# Patient Record
Sex: FEMALE | Race: BLACK OR AFRICAN AMERICAN | NOT HISPANIC OR LATINO | Employment: UNEMPLOYED | URBAN - METROPOLITAN AREA
[De-identification: names, ages, dates, MRNs, and addresses within clinical notes are randomized per-mention and may not be internally consistent; named-entity substitution may affect disease eponyms.]

---

## 2023-12-01 PROBLEM — F41.8 DEPRESSION WITH ANXIETY: Status: ACTIVE | Noted: 2023-12-01

## 2023-12-01 PROBLEM — L08.9 WOUND INFECTION: Status: ACTIVE | Noted: 2023-12-01

## 2023-12-01 PROBLEM — R30.0 DYSURIA: Status: ACTIVE | Noted: 2023-12-01

## 2023-12-01 PROBLEM — M25.50 ARTHRALGIA: Status: ACTIVE | Noted: 2023-12-01

## 2023-12-01 PROBLEM — C73 PAPILLARY CARCINOMA OF THYROID (MULTI): Status: ACTIVE | Noted: 2023-12-01

## 2023-12-01 PROBLEM — E55.9 VITAMIN D DEFICIENCY: Status: ACTIVE | Noted: 2023-12-01

## 2023-12-01 PROBLEM — C16.9: Status: ACTIVE | Noted: 2023-12-01

## 2023-12-01 PROBLEM — M76.61 ACHILLES TENDINITIS OF RIGHT LOWER EXTREMITY: Status: ACTIVE | Noted: 2023-12-01

## 2023-12-01 PROBLEM — G47.00 INSOMNIA: Status: ACTIVE | Noted: 2023-12-01

## 2023-12-01 PROBLEM — D64.9 ANEMIA: Status: ACTIVE | Noted: 2023-12-01

## 2023-12-01 PROBLEM — E61.1 IRON DEFICIENCY: Status: ACTIVE | Noted: 2023-12-01

## 2023-12-01 PROBLEM — F31.9 BIPOLAR DISORDER (MULTI): Status: ACTIVE | Noted: 2023-12-01

## 2023-12-01 PROBLEM — B37.9 YEAST INFECTION: Status: ACTIVE | Noted: 2023-12-01

## 2023-12-01 PROBLEM — H18.832 RECURRENT EROSION OF LEFT CORNEA: Status: ACTIVE | Noted: 2023-12-01

## 2023-12-01 PROBLEM — R82.90 ABNORMAL URINE FINDINGS: Status: ACTIVE | Noted: 2023-12-01

## 2023-12-01 PROBLEM — Z98.890 HISTORY OF ESOPHAGOGASTRODUODENOSCOPY (EGD): Status: ACTIVE | Noted: 2023-12-01

## 2023-12-01 PROBLEM — H18.529 ABM (ANTERIOR BASEMENT MEMBRANE) DYSTROPHY: Status: ACTIVE | Noted: 2023-12-01

## 2023-12-01 PROBLEM — N39.0 URINARY TRACT INFECTION: Status: ACTIVE | Noted: 2023-12-01

## 2023-12-01 PROBLEM — M76.62 ACHILLES TENDINITIS OF LEFT LOWER EXTREMITY: Status: ACTIVE | Noted: 2023-12-01

## 2023-12-01 PROBLEM — N92.0 MENORRHAGIA: Status: ACTIVE | Noted: 2023-12-01

## 2023-12-01 PROBLEM — E04.9 GOITER: Status: ACTIVE | Noted: 2023-12-01

## 2023-12-01 PROBLEM — E66.9 OBESITY: Status: ACTIVE | Noted: 2023-12-01

## 2023-12-01 PROBLEM — T14.8XXA WOUND INFECTION: Status: ACTIVE | Noted: 2023-12-01

## 2023-12-01 PROBLEM — E11.9 DIABETES MELLITUS TYPE 2 WITHOUT RETINOPATHY (MULTI): Status: ACTIVE | Noted: 2023-12-01

## 2023-12-01 PROBLEM — E03.9 HYPOTHYROIDISM: Status: ACTIVE | Noted: 2023-12-01

## 2023-12-01 PROBLEM — F41.9 ANXIETY: Status: ACTIVE | Noted: 2023-12-01

## 2023-12-01 PROBLEM — C50.919 CANCER OF BREAST (MULTI): Status: ACTIVE | Noted: 2023-12-01

## 2023-12-01 PROBLEM — N64.4 BREAST PAIN: Status: ACTIVE | Noted: 2023-12-01

## 2023-12-01 RX ORDER — ALOGLIPTIN 25 MG/1
1 TABLET, FILM COATED ORAL DAILY
COMMUNITY
Start: 2017-03-08

## 2023-12-01 RX ORDER — LEVOTHYROXINE SODIUM 50 UG/1
TABLET ORAL
COMMUNITY
Start: 2020-02-03 | End: 2024-03-14 | Stop reason: SDUPTHER

## 2023-12-01 RX ORDER — CHOLECALCIFEROL (VITAMIN D3) 50 MCG
1 TABLET ORAL DAILY
COMMUNITY
Start: 2017-04-19 | End: 2024-03-19 | Stop reason: SDUPTHER

## 2023-12-01 RX ORDER — BLOOD-GLUCOSE METER
KIT MISCELLANEOUS 2 TIMES DAILY
COMMUNITY
Start: 2014-08-07

## 2023-12-01 RX ORDER — SITAGLIPTIN AND METFORMIN HYDROCHLORIDE 1000; 50 MG/1; MG/1
1 TABLET, FILM COATED ORAL
COMMUNITY
Start: 2022-06-27 | End: 2024-05-09 | Stop reason: WASHOUT

## 2023-12-01 RX ORDER — TALC
POWDER (GRAM) TOPICAL
COMMUNITY
Start: 2014-04-08

## 2023-12-01 RX ORDER — FERROUS SULFATE 325(65) MG
1 TABLET ORAL
COMMUNITY
Start: 2017-04-19 | End: 2024-05-14 | Stop reason: SDUPTHER

## 2024-03-06 ENCOUNTER — LAB (OUTPATIENT)
Dept: LAB | Facility: LAB | Age: 44
End: 2024-03-06
Payer: COMMERCIAL

## 2024-03-06 ENCOUNTER — OFFICE VISIT (OUTPATIENT)
Dept: ENDOCRINOLOGY | Facility: HOSPITAL | Age: 44
End: 2024-03-06
Payer: COMMERCIAL

## 2024-03-06 VITALS
HEART RATE: 101 BPM | WEIGHT: 193 LBS | SYSTOLIC BLOOD PRESSURE: 127 MMHG | BODY MASS INDEX: 31.02 KG/M2 | HEIGHT: 66 IN | TEMPERATURE: 97.5 F | OXYGEN SATURATION: 100 % | DIASTOLIC BLOOD PRESSURE: 81 MMHG

## 2024-03-06 DIAGNOSIS — E03.8 HYPOTHYROIDISM DUE TO HASHIMOTO'S THYROIDITIS: ICD-10-CM

## 2024-03-06 DIAGNOSIS — E06.3 HYPOTHYROIDISM DUE TO HASHIMOTO'S THYROIDITIS: ICD-10-CM

## 2024-03-06 DIAGNOSIS — E11.9 DIABETES MELLITUS TYPE 2 WITHOUT RETINOPATHY (MULTI): ICD-10-CM

## 2024-03-06 DIAGNOSIS — C73 PAPILLARY THYROID CARCINOMA (MULTI): ICD-10-CM

## 2024-03-06 DIAGNOSIS — E11.9 DIABETES MELLITUS TYPE 2 WITHOUT RETINOPATHY (MULTI): Primary | ICD-10-CM

## 2024-03-06 LAB
25(OH)D3 SERPL-MCNC: 26 NG/ML (ref 30–100)
ALBUMIN SERPL BCP-MCNC: 3.8 G/DL (ref 3.4–5)
ANION GAP SERPL CALC-SCNC: 13 MMOL/L (ref 10–20)
BUN SERPL-MCNC: 9 MG/DL (ref 6–23)
CALCIUM SERPL-MCNC: 8.8 MG/DL (ref 8.6–10.6)
CHLORIDE SERPL-SCNC: 103 MMOL/L (ref 98–107)
CHOLEST SERPL-MCNC: 188 MG/DL (ref 0–199)
CHOLESTEROL/HDL RATIO: 3.2
CO2 SERPL-SCNC: 24 MMOL/L (ref 21–32)
CREAT SERPL-MCNC: 0.78 MG/DL (ref 0.5–1.05)
CREAT UR-MCNC: 80.4 MG/DL (ref 20–320)
EGFRCR SERPLBLD CKD-EPI 2021: >90 ML/MIN/1.73M*2
EST. AVERAGE GLUCOSE BLD GHB EST-MCNC: 243 MG/DL
GLUCOSE BLD MANUAL STRIP-MCNC: 343 MG/DL (ref 74–99)
GLUCOSE SERPL-MCNC: 294 MG/DL (ref 74–99)
HBA1C MFR BLD: 10.1 %
HDLC SERPL-MCNC: 59.5 MG/DL
LDLC SERPL CALC-MCNC: 85 MG/DL
MICROALBUMIN UR-MCNC: 15.4 MG/L
MICROALBUMIN/CREAT UR: 19.2 UG/MG CREAT
NON HDL CHOLESTEROL: 129 MG/DL (ref 0–149)
PHOSPHATE SERPL-MCNC: 4 MG/DL (ref 2.5–4.9)
POTASSIUM SERPL-SCNC: 4.3 MMOL/L (ref 3.5–5.3)
PTH-INTACT SERPL-MCNC: 28.4 PG/ML (ref 18.5–88)
SODIUM SERPL-SCNC: 136 MMOL/L (ref 136–145)
T4 FREE SERPL-MCNC: 0.53 NG/DL (ref 0.78–1.48)
TRIGL SERPL-MCNC: 216 MG/DL (ref 0–149)
TSH SERPL-ACNC: 35.63 MIU/L (ref 0.44–3.98)
VLDL: 43 MG/DL (ref 0–40)

## 2024-03-06 PROCEDURE — 36415 COLL VENOUS BLD VENIPUNCTURE: CPT

## 2024-03-06 PROCEDURE — 80069 RENAL FUNCTION PANEL: CPT

## 2024-03-06 PROCEDURE — 84439 ASSAY OF FREE THYROXINE: CPT

## 2024-03-06 PROCEDURE — 3048F LDL-C <100 MG/DL: CPT | Performed by: STUDENT IN AN ORGANIZED HEALTH CARE EDUCATION/TRAINING PROGRAM

## 2024-03-06 PROCEDURE — 99215 OFFICE O/P EST HI 40 MIN: CPT | Performed by: STUDENT IN AN ORGANIZED HEALTH CARE EDUCATION/TRAINING PROGRAM

## 2024-03-06 PROCEDURE — 83970 ASSAY OF PARATHORMONE: CPT

## 2024-03-06 PROCEDURE — 3061F NEG MICROALBUMINURIA REV: CPT | Performed by: STUDENT IN AN ORGANIZED HEALTH CARE EDUCATION/TRAINING PROGRAM

## 2024-03-06 PROCEDURE — 36416 COLLJ CAPILLARY BLOOD SPEC: CPT | Performed by: STUDENT IN AN ORGANIZED HEALTH CARE EDUCATION/TRAINING PROGRAM

## 2024-03-06 PROCEDURE — 82043 UR ALBUMIN QUANTITATIVE: CPT

## 2024-03-06 PROCEDURE — 83036 HEMOGLOBIN GLYCOSYLATED A1C: CPT

## 2024-03-06 PROCEDURE — 1036F TOBACCO NON-USER: CPT | Performed by: STUDENT IN AN ORGANIZED HEALTH CARE EDUCATION/TRAINING PROGRAM

## 2024-03-06 PROCEDURE — 84432 ASSAY OF THYROGLOBULIN: CPT

## 2024-03-06 PROCEDURE — 3074F SYST BP LT 130 MM HG: CPT | Performed by: STUDENT IN AN ORGANIZED HEALTH CARE EDUCATION/TRAINING PROGRAM

## 2024-03-06 PROCEDURE — 3079F DIAST BP 80-89 MM HG: CPT | Performed by: STUDENT IN AN ORGANIZED HEALTH CARE EDUCATION/TRAINING PROGRAM

## 2024-03-06 PROCEDURE — 82306 VITAMIN D 25 HYDROXY: CPT

## 2024-03-06 PROCEDURE — 3046F HEMOGLOBIN A1C LEVEL >9.0%: CPT | Performed by: STUDENT IN AN ORGANIZED HEALTH CARE EDUCATION/TRAINING PROGRAM

## 2024-03-06 PROCEDURE — 82947 ASSAY GLUCOSE BLOOD QUANT: CPT | Performed by: STUDENT IN AN ORGANIZED HEALTH CARE EDUCATION/TRAINING PROGRAM

## 2024-03-06 PROCEDURE — 84443 ASSAY THYROID STIM HORMONE: CPT

## 2024-03-06 PROCEDURE — 86800 THYROGLOBULIN ANTIBODY: CPT

## 2024-03-06 PROCEDURE — 82570 ASSAY OF URINE CREATININE: CPT

## 2024-03-06 PROCEDURE — 80061 LIPID PANEL: CPT

## 2024-03-06 RX ORDER — BLOOD-GLUCOSE SENSOR
EACH MISCELLANEOUS
Qty: 2 EACH | Refills: 11 | Status: SHIPPED | OUTPATIENT
Start: 2024-03-06

## 2024-03-06 RX ORDER — METFORMIN HYDROCHLORIDE 1000 MG/1
1000 TABLET ORAL
Qty: 180 TABLET | Refills: 3 | Status: SHIPPED | OUTPATIENT
Start: 2024-03-06 | End: 2025-03-06

## 2024-03-06 RX ORDER — DULAGLUTIDE 0.75 MG/.5ML
0.75 INJECTION, SOLUTION SUBCUTANEOUS
Qty: 2 ML | Refills: 3 | Status: SHIPPED | OUTPATIENT
Start: 2024-03-06

## 2024-03-06 RX ORDER — GLIPIZIDE 5 MG/1
5 TABLET ORAL
Qty: 180 TABLET | Refills: 3 | Status: SHIPPED | OUTPATIENT
Start: 2024-03-06 | End: 2025-03-06

## 2024-03-06 SDOH — ECONOMIC STABILITY: FOOD INSECURITY: WITHIN THE PAST 12 MONTHS, THE FOOD YOU BOUGHT JUST DIDN'T LAST AND YOU DIDN'T HAVE MONEY TO GET MORE.: NEVER TRUE

## 2024-03-06 SDOH — ECONOMIC STABILITY: FOOD INSECURITY: WITHIN THE PAST 12 MONTHS, YOU WORRIED THAT YOUR FOOD WOULD RUN OUT BEFORE YOU GOT MONEY TO BUY MORE.: NEVER TRUE

## 2024-03-06 ASSESSMENT — ENCOUNTER SYMPTOMS
DEPRESSION: 0
LOSS OF SENSATION IN FEET: 0
OCCASIONAL FEELINGS OF UNSTEADINESS: 0

## 2024-03-06 ASSESSMENT — LIFESTYLE VARIABLES
SKIP TO QUESTIONS 9-10: 1
HOW OFTEN DO YOU HAVE A DRINK CONTAINING ALCOHOL: NEVER
AUDIT-C TOTAL SCORE: 0
HOW MANY STANDARD DRINKS CONTAINING ALCOHOL DO YOU HAVE ON A TYPICAL DAY: PATIENT DOES NOT DRINK
HOW OFTEN DO YOU HAVE SIX OR MORE DRINKS ON ONE OCCASION: NEVER

## 2024-03-06 ASSESSMENT — PATIENT HEALTH QUESTIONNAIRE - PHQ9
6. FEELING BAD ABOUT YOURSELF - OR THAT YOU ARE A FAILURE OR HAVE LET YOURSELF OR YOUR FAMILY DOWN: NEARLY EVERY DAY
1. LITTLE INTEREST OR PLEASURE IN DOING THINGS: NEARLY EVERY DAY
4. FEELING TIRED OR HAVING LITTLE ENERGY: NEARLY EVERY DAY
3. TROUBLE FALLING OR STAYING ASLEEP: NEARLY EVERY DAY
SUM OF ALL RESPONSES TO PHQ QUESTIONS 1-9: 22
9. THOUGHTS THAT YOU WOULD BE BETTER OFF DEAD, OR OF HURTING YOURSELF: SEVERAL DAYS
5. POOR APPETITE OR OVEREATING: NEARLY EVERY DAY
10. IF YOU CHECKED OFF ANY PROBLEMS, HOW DIFFICULT HAVE THESE PROBLEMS MADE IT FOR YOU TO DO YOUR WORK, TAKE CARE OF THINGS AT HOME, OR GET ALONG WITH OTHER PEOPLE: NOT DIFFICULT AT ALL
2. FEELING DOWN, DEPRESSED OR HOPELESS: NEARLY EVERY DAY
SUM OF ALL RESPONSES TO PHQ9 QUESTIONS 1 & 2: 6
7. TROUBLE CONCENTRATING ON THINGS, SUCH AS READING THE NEWSPAPER OR WATCHING TELEVISION: NEARLY EVERY DAY
8. MOVING OR SPEAKING SO SLOWLY THAT OTHER PEOPLE COULD HAVE NOTICED. OR THE OPPOSITE, BEING SO FIGETY OR RESTLESS THAT YOU HAVE BEEN MOVING AROUND A LOT MORE THAN USUAL: NOT AT ALL

## 2024-03-06 ASSESSMENT — PAIN SCALES - GENERAL: PAINLEVEL: 0-NO PAIN

## 2024-03-06 NOTE — PATIENT INSTRUCTIONS
Stop Janumet  Start Metformin 1000 mg twice daily with meals  Start Trulicity 0.75 mg once weekly  Start glipizide 5 mg twice daily  Check BG 2-3 times a day (Always in the morning before breakfast, and alternate before lunch, before dinner or bed time)  Watch diet and dietician referral  Podiatry and ophthalmology referral   Blood work today    For your thyroid  Continue Levothyroxine 250 mcg daily  to be taken on an empty stomach on its own 30min before other meds or food   Blood work today  Ultrasound thyroid    Clinical pharmacy in 2 months    RTC in July

## 2024-03-06 NOTE — PROGRESS NOTES
HPI: Stephanie Flores is a 42yo F with PMH of T2DM, anemia, breast cancer, vitamin D deficiency, DLD, PTC s/p total thyroidectomy, and obesity who presents to establish care for T2DM and PTC s/p total thyroidectomy.     Pertinent T2DM history:  -Diagnosed with T2DM: 2011. Had gotten surgery for breast cancer and 3 months afterwards, started to have vomiting and weight loss. Went to the ER and BG was 500s. Was admitted to the ICU and started on insulin gtt. Discharged on insulin. Was on insulin for 1 year and then transitioned to oral medications.   -Last HbA1C 10% (4/6/22)    Current diabetes regimen:  -Janumet 50-1000mg BID    Previous medications:  -Metformin alone  -Alogliptin (can't remember why it was stopped)  -Insulin (MDI--4x/day injections) for about 1 year after diagnosis    Diabetes screening  -Retinopathy: Unknown, no recent screening eye exam. Has some blurry vision  -Nephropathy: No ESRD. Last urine albumin/Cr 53.6 (6/27/22), last Cr 0.71, (4/6/22). Not on ACEi/ARB or SGLT2i  -Neuropathy: Has some numbness/tingling in legs  -CV disease: No prior CAD. Last LDL 99  (6/7/22). Not on statin    Pertinent thyroid hx:   -Diagnosed in 2015 s/p total thyroidectomy 5/11/15. Path showing 2.2 cm focus of follicular variant PTC, no invasion, and additional 1 mm focus. 1 parathyroid gland removed as well, mild post-op hypocalcemia  -Reportedly no remnant ablation done due to social situation but no evidence of persistent disease   -Thyrogen stimulated Tg in 10/2015 was 1.87 with a TSH of 23, unable to do body scans.   Tg down to 0.6 with TSH 1.6 in 3/2016  -Thyrogen testing done 12/2016. TSH highest 21, Tg 2.8. No scanning done.  -Previously followed with Dr. Louie Knowles in endocrinology, last seen 7/26/21; sporadic follow-up  -Currently on levothyroxine 250mcg daily, has been on this for about 3-4 years  -Last TSH 3.88 (4/6/22)  -Last TG 1.0 (7/6/21) with TSH 2.67, FT4 1.91    Today:  -Has been struggling with dizziness  "and carpal tunnel in the last year  -Recent illnesses or steroid exposure:  Denies   -Accuchecks 1x/day maybe. Checks in the afternoon (about 1 hour after lunch) or if she is feeling off. Bgs are running in the 170-200s mainly.    Currrent medications:  -Janumet 50-1000mg BID: Takes consistently, no missed doses  -Levothyroxine: On 250mcg daily. Takes it once per day, waits for 1 hour to eat breakfast and takes it separately from her other medications  -Not on any medications for cholesterol or high blood pressure     OTC medications: Denies    Hypoglycemia: Denies     Diet:  -Breakfast: Sausage, grits, eggs  -Lunch: [place]   -Dinner: Baked or air fried chicken, rice, and veggie   -Snack: Occasionally, will sometimes snack on chips and dip  -Beverages: A lot of pop (mostly regular) , water    Exercise: Walks and does sit-ups    ROS:  -Hx of UTI: None in almost 2 years, used to have them often. Stopped out of the blue.   -Hx of pancreatitis:  Denies   -Neck pain: Denies anterior neck pain  -Numbness/tingling: In her b/l feet  -Feet issues: Denies  -HA: If BG is high  -Vision changes: Endorses mild blurry vision  -Hoarseness: Denies  -Dysphagia: Yes, has some issues with meats if they aren't cut up small or super tender. Feels like they get stuck.  -Acid reflux: Denies   -Night sweats/hot flashes: frequently  -Periods: Monthly, heavy. Lasting 8 days now instead of 10 days   -Sleep: Takes awhile to fall asleep, but sleeps well  -Energy:  Low   -Appetite: \"Fickle,\" comes and goes   -Change in weight: Lost about 20 lbs intentionally (was stuck at 220 lbs for years, now down 193 lbs)   -Diarrhea/Constipation: Alternates between diarrhea and constipation  -Polyuria: Denies   -Polydipsia: Does feel thirsty a lot   -Tremors: Denies  -Muscle cramps: In her legs   -Nausea/vomiting: Denies   -Abdominal Pain: Denies     10 point ROS neg unless stated above    PMH: as above    Allergies: NKDA     Social Hx:  -Alcohol: " "Denies  -Tobacco: Denies  -Illicits: Denies  , has children    Family Hx:  -Mother: pancreatic and ovarian cancers  -Grandmother: breast cancer and lung cancer  -Great grandmother: stomach cancer  -Great aunt: breast cancer  -Cousin: stomach cancer  -Sister: lung cancer   -Father: HTN, MI   -Denies family hx of thyroid cancer    Current Outpatient Medications   Medication Instructions    alogliptin (Nesina) 25 mg tablet 1 tablet, oral, Daily    cholecalciferol (Vitamin D-3) 50 MCG (2000 UT) tablet 1 tablet, oral, Daily    ferrous sulfate, 325 mg ferrous sulfate, tablet 1 tablet, oral, 2 times daily with meals    FreeStyle Lite Strips strip 2 times daily    levothyroxine (Synthroid, Levoxyl) 50 mcg tablet oral    melatonin 3 mg tablet oral    sitaGLIPtin phos-metformin (Janumet) 50-1,000 mg tablet 1 tablet, oral, 2 times daily with meals      O:  VS: /81 (BP Location: Right arm, Patient Position: Sitting, BP Cuff Size: Adult)   Pulse 101   Temp 36.4 °C (97.5 °F) (Temporal)   Ht 1.664 m (5' 5.5\")   Wt 87.5 kg (193 lb)   SpO2 100%   BMI 31.63 kg/m²      PE:   General: Alert, oriented x 3. No acute distress.  HEENT: EOMI, PERRL. Oral cavity mucosa moist. No palpable thyroid tissue or LAD. Anterior neck scar noted.   Lungs: Clear to auscultation.  Heart: S1 and S2, regular.  Abd: soft, NT, BS +ve, obese  Ext: no LE edema.  Skin: warm, dry.  Neuro: AOx3, no focal deficits, DTR 2+  Psych: Appropriate mood and behavior     LABS:          A/P:  Stephanie Flores is a 42yo F with PMH of T2DM, anemia, breast cancer, vitamin D deficiency, DLD, PTC s/p total thyroidectomy, and obesity who presents to establish care for T2DM and PTC s/p total thyroidectomy.     #T2DM c/b nephropathy and ?neuropathy  ::Goal HbA1C 7.0%, last HbA1C 10% (4/6/22)  -STOP janumet 50-1000mg BID   -START metformin 1000mg BID   -START trulicity 0.75mg weekly  -START glipizide 5mg BID with meals   -Accuchecks 2-3x/day    -Order Freestyle " sloane 3  -Due for updated labs: HbA1C, CMP, Lipid panel, urine microalb/Cr  -Consider initiation of statin and ACEi/ARB next appt   -Refer to dietician   -Refer to podiatry given numbness/tingling in her feet  -Refer to ophthalmology for screening eye exam, advised yearly eye exams    #PTC s/p total thyroidectomy (2015) with neg stimulated TG in 2015 and 2016  ::Clinically euthyroid  -Obtain updated TSH, FT4, and thyroglobulin levels  -Obtain thyroid ultrasound for baseline   -c/w levothyroxine 250mcg daily, to be taken first thing in the AM on an empty stomach  from other medications/food by at least 30 minutes     RTC in 4 mo with endocrinology   RTC in 2 mo with clinical pharmD     Patient seen, discussed, and examined with Dr. Young who agrees with the management plan

## 2024-03-08 LAB
BILL ONLY-THYROGLOBULIN: NORMAL
THYROGLOB AB SERPL-ACNC: <0.9 IU/ML (ref 0–4)
THYROGLOB SERPL-MCNC: 12.3 NG/ML (ref 1.3–31.8)
THYROGLOB SERPL-MCNC: NORMAL NG/ML (ref 1.3–31.8)

## 2024-03-14 DIAGNOSIS — E03.8 OTHER SPECIFIED HYPOTHYROIDISM: ICD-10-CM

## 2024-03-14 DIAGNOSIS — E55.9 VITAMIN D DEFICIENCY: ICD-10-CM

## 2024-03-14 RX ORDER — LEVOTHYROXINE SODIUM 200 UG/1
1 TABLET ORAL DAILY
COMMUNITY
Start: 2015-05-12 | End: 2024-03-14 | Stop reason: SDUPTHER

## 2024-03-15 RX ORDER — LEVOTHYROXINE SODIUM 200 UG/1
TABLET ORAL
Qty: 30 TABLET | Refills: 3 | Status: SHIPPED | OUTPATIENT
Start: 2024-03-15

## 2024-03-15 RX ORDER — LEVOTHYROXINE SODIUM 50 UG/1
TABLET ORAL
Qty: 30 TABLET | Refills: 3 | Status: SHIPPED | OUTPATIENT
Start: 2024-03-15

## 2024-03-19 RX ORDER — CHOLECALCIFEROL (VITAMIN D3) 50 MCG
TABLET ORAL
Qty: 30 TABLET | Refills: 3 | Status: SHIPPED | OUTPATIENT
Start: 2024-03-19 | End: 2024-03-20 | Stop reason: SDUPTHER

## 2024-03-20 ENCOUNTER — TELEPHONE (OUTPATIENT)
Dept: PRIMARY CARE | Facility: HOSPITAL | Age: 44
End: 2024-03-20
Payer: COMMERCIAL

## 2024-03-20 DIAGNOSIS — E03.8 OTHER SPECIFIED HYPOTHYROIDISM: ICD-10-CM

## 2024-03-20 RX ORDER — CHOLECALCIFEROL (VITAMIN D3) 50 MCG
TABLET ORAL
Qty: 30 TABLET | Refills: 3 | Status: SHIPPED | OUTPATIENT
Start: 2024-03-20

## 2024-03-20 NOTE — TELEPHONE ENCOUNTER
Called patient and discussed blood work.  Was not taking her Levothyroxine regularly (missed a lot of levothyroxine) restarted now.  Will repeat in 6 weeks.   TG elevated has her US scheduled on the 1st. Will decide if anything else is needed.   Vitamin D 26 start vitamin D 2000IU daily  A1c elevated continue Diabetes meds as discussed

## 2024-03-29 ENCOUNTER — TELEMEDICINE CLINICAL SUPPORT (OUTPATIENT)
Dept: NUTRITION | Facility: HOSPITAL | Age: 44
End: 2024-03-29
Payer: COMMERCIAL

## 2024-03-29 VITALS — WEIGHT: 193 LBS | BODY MASS INDEX: 31.02 KG/M2 | HEIGHT: 66 IN

## 2024-03-29 DIAGNOSIS — E11.9 DIABETES MELLITUS TYPE 2 WITHOUT RETINOPATHY (MULTI): ICD-10-CM

## 2024-03-29 PROCEDURE — 97802 MEDICAL NUTRITION INDIV IN: CPT | Mod: GT | Performed by: STUDENT IN AN ORGANIZED HEALTH CARE EDUCATION/TRAINING PROGRAM

## 2024-03-29 NOTE — PATIENT INSTRUCTIONS
1) Eat 3 consistent meals per day with 1-2 snacks in between  2) Consume 45g of carbs per meal, 15g carb choice for a snack   3) Make sure protein is included in every meal and snack   4) Increase water intake to 64 ounces   5) Limit added sugars to less than 25g per day   6) Use Plate method to build meals: 1/2 nonstarchy vegetables, 1/4 starch, 1/4 protein   7) Keep track of blood sugars   8) Increase physical activity

## 2024-03-29 NOTE — LETTER
"March 29, 2024     Betsy Young MD  [id number]  Department Of Medicine-Endocrinology  [facility] [address]    Patient: Stephanie Flores   YOB: 1980   Date of Visit: 3/29/2024       Dear Dr. Betsy Young MD:    Thank you for referring Stephanie Flores to me for evaluation. Below are my notes for this consultation.  If you have questions, please do not hesitate to call me. I look forward to following your patient along with you.       Sincerely,     Missy Duarte, ANGELIC, LD      CC: No Recipients  ______________________________________________________________________________________    Nutrition Assessment    Reason for Visit:  Stephanie Flores is a 43 y.o. female who presents for diabetes education.     Anthropometrics:  Anthropometrics  Height: 166.4 cm (5' 5.5\")  Weight: 87.5 kg (193 lb)  BMI (Calculated): 31.62  IBW/kg (Dietitian Calculated): 56.8 kg  Percent of IBW: 154 %         Food And Nutrient Intake:  Food and Nutrient History  Food and Nutrient History: Pt is on new DM meds, Trulicity and on her last injection today. Pt has had no appetite since starting Trulicity. Prior to medication pt was eating more carbs. Pt has done carb counting in the past and aware of this method. Currently eating about 1 carb per meal, eats about 2 meals per day. Every now again pt has snacks. Checks BG 3x daily. In the AM: 90mg/dL, afternoon/lunch: 125mg/dL, after dinner 117-125mg/dL.  Energy Intake: Fair 50-75 %  Fluid Intake: water, tea powder with stevia.  Food Allergy: none  Food Intolerance: none  GI Symptoms: none     Food Intake  Meal 1: Breakfast: grits with eggs, and sausage  Meal 2: Lunch: no lunch  Meal 3: Dinner: chicken wings  Snacks: none    Food Preparation  Cooking: Patient  Grocery Shopping: Patient  Dining Out: Rare to None       Bioactive Substance Intake  Pattern of Alcohol Consumption: None    Physical Activities:  Physical Activity  Physical Activity History: Walk 1 mile and a " "half  Consistency: Yes  Frequency (times/week): 3 (times/week)  Duration (minutes/day): 60 minutes/day      Nutrition Focused Physical Exam:  Subcutaneous Fat Loss  Orbital Fat Pads: Defer (VIR)        Energy Needs  Calculated Energy Needs Using Equations  Height: 166.4 cm (5' 5.5\")  Estimated Energy Needs  Total Energy Estimated Needs (kCal): 1600 kCal  Method for Estimating Needs: MSJ  Estimated Fluid Needs  Method for Estimating Needs: 64 ounces or 1ml/kcal  Estimated Protein Needs  Method for Estimating Needs: 70-88g (0.8-1g/kg ABW)  Estimated Carbohydrate Needs  Method for Estimating Needs: 2-3 carb choices per meal, 1 carb choice per snack        Nutrition Diagnosis     Nutrition Diagnosis  Patient has Nutrition Diagnosis: Yes  Diagnosis Status (1): New  Nutrition Diagnosis 1: Inadequate carbohydrate intake  Related to (1): DM2  As Evidenced by (1): carbohydrate intake inconsistent with needs, A1C 10.1%  Additional Assessment Information (1): low appetite due to new medication.    Nutrition Interventions/Recommendations  Nutrition Prescription  Individualized Nutrition Prescription Provided for : 1600kcal, 45g CCD  Food and Nutrition Delivery  Meals & Snacks: Carbohydrate-modified diet, Energy-modified diet  Goals: 3 meals per day with 1-2 snack in between  Nutrition Education  Nutrition Education Content: Content related nutrition education (plate method, reviewed carb counting)  Goals: Pt to comply with both verbal and written education provided in the one-one-one setting.  Nutrition Counseling  Strategies: Nutrition counseling based on goal setting strategy  Goals: 1) Eat 3 consistent meals per day with 1-2 snacks in between 2) Consume 45g of carbs per meal, 15g carb choice for a snack 3) Make sure protein is included in every meal and snack 4) Increase water intake to 64 ounces 5) Limit added sugars to less than 25g per day 6) Use Plate method to build meals: 1/2 nonstarchy vegetables, 1/4 starch, 1/4 " protein 7) Keep track of blood sugars 8) Increase physical activity        Patient Instructions   1) Eat 3 consistent meals per day with 1-2 snacks in between  2) Consume 45g of carbs per meal, 15g carb choice for a snack   3) Make sure protein is included in every meal and snack   4) Increase water intake to 64 ounces   5) Limit added sugars to less than 25g per day   6) Use Plate method to build meals: 1/2 nonstarchy vegetables, 1/4 starch, 1/4 protein   7) Keep track of blood sugars   8) Increase physical activity     Nutrition Monitoring and Evaluation  Food/Nutrient Related History Monitoring  Monitoring and Evaluation Plan: Energy intake, Fluid intake, Meal/snack pattern, Carbohydrate intake  Energy Intake: Estimated energy intake  Criteria: Intake vs Recommendations  Fluid Intake: Estimated fluid intake  Criteria: Intake vs Recommendations  Meal/Snack Pattern: Estimated meal and snack pattern  Criteria: Intake vs Recommendations  Estimated carbohydrate intake: Estimated carbohydrate intake  Criteria: Intake vs Recommendations  Body Composition/Growth/Weight History  Monitoring and Evaluation Plan: Weight  Weight: Measured weight  Criteria: Recent wt vs future weights  Biochemical Data, Medical Tests and Procedures  Monitoring and Evaluation Plan: Glucose/endocrine profile  Glucose/Endocrine Profile: Glucose, casual, Glucose, fasting, Hemoglobin A1c (HgbA1c)  Criteria: Blood sugars to stay WNL, fasting 70-130mg/dL, 1-2 hours after eating <180mg/dL, A1C <7%          Time Spent  Time spent directly with patient, family or caregiver: 20 minutes        Readiness to Change : Good  Level of Understanding : Good  Anticipated Compliant : Good

## 2024-03-29 NOTE — PROGRESS NOTES
"Nutrition Assessment     Reason for Visit:  Stephanie Flores is a 43 y.o. female who presents for diabetes education.     Anthropometrics:  Anthropometrics  Height: 166.4 cm (5' 5.5\")  Weight: 87.5 kg (193 lb)  BMI (Calculated): 31.62  IBW/kg (Dietitian Calculated): 56.8 kg  Percent of IBW: 154 %         Food And Nutrient Intake:  Food and Nutrient History  Food and Nutrient History: Pt is on new DM meds, Trulicity and on her last injection today. Pt has had no appetite since starting Trulicity. Prior to medication pt was eating more carbs. Pt has done carb counting in the past and aware of this method. Currently eating about 1 carb per meal, eats about 2 meals per day. Every now again pt has snacks. Checks BG 3x daily. In the AM: 90mg/dL, afternoon/lunch: 125mg/dL, after dinner 117-125mg/dL.  Energy Intake: Fair 50-75 %  Fluid Intake: water, tea powder with stevia.  Food Allergy: none  Food Intolerance: none  GI Symptoms: none     Food Intake  Meal 1: Breakfast: grits with eggs, and sausage  Meal 2: Lunch: no lunch  Meal 3: Dinner: chicken wings  Snacks: none    Food Preparation  Cooking: Patient  Grocery Shopping: Patient  Dining Out: Rare to None       Bioactive Substance Intake  Pattern of Alcohol Consumption: None    Physical Activities:  Physical Activity  Physical Activity History: Walk 1 mile and a half  Consistency: Yes  Frequency (times/week): 3 (times/week)  Duration (minutes/day): 60 minutes/day      Nutrition Focused Physical Exam:  Subcutaneous Fat Loss  Orbital Fat Pads: Defer (VIR)        Energy Needs  Calculated Energy Needs Using Equations  Height: 166.4 cm (5' 5.5\")  Estimated Energy Needs  Total Energy Estimated Needs (kCal): 1600 kCal  Method for Estimating Needs: MSJ  Estimated Fluid Needs  Method for Estimating Needs: 64 ounces or 1ml/kcal  Estimated Protein Needs  Method for Estimating Needs: 70-88g (0.8-1g/kg ABW)  Estimated Carbohydrate Needs  Method for Estimating Needs: 2-3 carb choices per " meal, 1 carb choice per snack        Nutrition Diagnosis      Nutrition Diagnosis  Patient has Nutrition Diagnosis: Yes  Diagnosis Status (1): New  Nutrition Diagnosis 1: Inadequate carbohydrate intake  Related to (1): DM2  As Evidenced by (1): carbohydrate intake inconsistent with needs, A1C 10.1%  Additional Assessment Information (1): low appetite due to new medication.    Nutrition Interventions/Recommendations   Nutrition Prescription  Individualized Nutrition Prescription Provided for : 1600kcal, 45g CCD  Food and Nutrition Delivery  Meals & Snacks: Carbohydrate-modified diet, Energy-modified diet  Goals: 3 meals per day with 1-2 snack in between  Nutrition Education  Nutrition Education Content: Content related nutrition education (plate method, reviewed carb counting)  Goals: Pt to comply with both verbal and written education provided in the one-one-one setting.  Nutrition Counseling  Strategies: Nutrition counseling based on goal setting strategy  Goals: 1) Eat 3 consistent meals per day with 1-2 snacks in between 2) Consume 45g of carbs per meal, 15g carb choice for a snack 3) Make sure protein is included in every meal and snack 4) Increase water intake to 64 ounces 5) Limit added sugars to less than 25g per day 6) Use Plate method to build meals: 1/2 nonstarchy vegetables, 1/4 starch, 1/4 protein 7) Keep track of blood sugars 8) Increase physical activity        Patient Instructions   1) Eat 3 consistent meals per day with 1-2 snacks in between  2) Consume 45g of carbs per meal, 15g carb choice for a snack   3) Make sure protein is included in every meal and snack   4) Increase water intake to 64 ounces   5) Limit added sugars to less than 25g per day   6) Use Plate method to build meals: 1/2 nonstarchy vegetables, 1/4 starch, 1/4 protein   7) Keep track of blood sugars   8) Increase physical activity     Nutrition Monitoring and Evaluation   Food/Nutrient Related History Monitoring  Monitoring and  Evaluation Plan: Energy intake, Fluid intake, Meal/snack pattern, Carbohydrate intake  Energy Intake: Estimated energy intake  Criteria: Intake vs Recommendations  Fluid Intake: Estimated fluid intake  Criteria: Intake vs Recommendations  Meal/Snack Pattern: Estimated meal and snack pattern  Criteria: Intake vs Recommendations  Estimated carbohydrate intake: Estimated carbohydrate intake  Criteria: Intake vs Recommendations  Body Composition/Growth/Weight History  Monitoring and Evaluation Plan: Weight  Weight: Measured weight  Criteria: Recent wt vs future weights  Biochemical Data, Medical Tests and Procedures  Monitoring and Evaluation Plan: Glucose/endocrine profile  Glucose/Endocrine Profile: Glucose, casual, Glucose, fasting, Hemoglobin A1c (HgbA1c)  Criteria: Blood sugars to stay WNL, fasting 70-130mg/dL, 1-2 hours after eating <180mg/dL, A1C <7%          Time Spent  Time spent directly with patient, family or caregiver: 20 minutes        Readiness to Change : Good  Level of Understanding : Good  Anticipated Compliant : Good

## 2024-04-01 ENCOUNTER — HOSPITAL ENCOUNTER (OUTPATIENT)
Dept: RADIOLOGY | Facility: HOSPITAL | Age: 44
Discharge: HOME | End: 2024-04-01
Payer: COMMERCIAL

## 2024-04-01 DIAGNOSIS — C73 PAPILLARY THYROID CARCINOMA (MULTI): ICD-10-CM

## 2024-04-01 DIAGNOSIS — E11.9 DIABETES MELLITUS TYPE 2 WITHOUT RETINOPATHY (MULTI): ICD-10-CM

## 2024-04-01 PROCEDURE — 76536 US EXAM OF HEAD AND NECK: CPT | Performed by: RADIOLOGY

## 2024-04-01 PROCEDURE — 76536 US EXAM OF HEAD AND NECK: CPT

## 2024-04-17 ENCOUNTER — OFFICE VISIT (OUTPATIENT)
Dept: OTOLARYNGOLOGY | Facility: CLINIC | Age: 44
End: 2024-04-17
Payer: COMMERCIAL

## 2024-04-17 VITALS — HEIGHT: 65 IN | WEIGHT: 188.6 LBS | BODY MASS INDEX: 31.42 KG/M2 | TEMPERATURE: 97.5 F

## 2024-04-17 DIAGNOSIS — H81.11 BENIGN PAROXYSMAL POSITIONAL VERTIGO OF RIGHT EAR: Primary | ICD-10-CM

## 2024-04-17 DIAGNOSIS — H93.11 TINNITUS OF RIGHT EAR: ICD-10-CM

## 2024-04-17 PROCEDURE — 3061F NEG MICROALBUMINURIA REV: CPT | Performed by: NURSE PRACTITIONER

## 2024-04-17 PROCEDURE — 3046F HEMOGLOBIN A1C LEVEL >9.0%: CPT | Performed by: NURSE PRACTITIONER

## 2024-04-17 PROCEDURE — 3048F LDL-C <100 MG/DL: CPT | Performed by: NURSE PRACTITIONER

## 2024-04-17 PROCEDURE — 1036F TOBACCO NON-USER: CPT | Performed by: NURSE PRACTITIONER

## 2024-04-17 PROCEDURE — 99204 OFFICE O/P NEW MOD 45 MIN: CPT | Performed by: NURSE PRACTITIONER

## 2024-04-17 ASSESSMENT — PATIENT HEALTH QUESTIONNAIRE - PHQ9
2. FEELING DOWN, DEPRESSED OR HOPELESS: NOT AT ALL
1. LITTLE INTEREST OR PLEASURE IN DOING THINGS: NOT AT ALL
SUM OF ALL RESPONSES TO PHQ9 QUESTIONS 1 AND 2: 0

## 2024-04-17 NOTE — PATIENT INSTRUCTIONS
You may have a condition called BPPV which stands for benign paroxysmal positional vertigo.  See information below  Please obtain the audiogram, VNG and vHIT.  I will discuss results with you once these are completed.                            Yeah

## 2024-04-17 NOTE — PROGRESS NOTES
Subjective   Patient ID: Stephanie Flores is a 43 y.o. female who presents for Tinnitus (bilteral) and Vertigo.  No hearing test on file.  HPI  Patient has a history of type 2 diabetes, goiter, thyroid cancer status post chemo and radiation, obesity and vertigo.    Patient recalls vertigo started about 2 years ago.  She was laying down and turned that the vertigo started.  She states that she knows it is coming from her right ear as this is also the ear that is affected with tinnitus but no significant hearing loss.  She describes vertigo as brief and induced with turning head quickly, laying down in bed,  getting up from laying down and bending over.  This occurs mostly daily.  She was evaluated in the ED before and was prescribed meclizine and Valium which does not help.  Her tinnitus is constantly present.  She describes it as high-pitched noise predominantly right ear.        She denies any other history of ear surgery or trauma to the ears.  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  No family history of ear disease and no exposure to ototoxic medications.  No complaints of drainage or ear pain.    When asked about ear pain, headache, phono-photophobia, visual or motion intolerance, sound or pressure induced symptoms, hearing loss, discharge from ear,, aural fullness or autophony, the patient admits to none.            Review of Systems       All other systems have been reviewed and are negative for complaints except for those mentioned in history of present illness, past medical history and problem list       Objective   Physical Exam    CONSTITUTIONAL: No acute distress, normal facial features; No fever; no chills  VOICE: No hoarseness or other audible abnormality  RESPIRATION: Breathing comfortably, no stridor; normal breathing effort  CV: No cyanosis visible on the face and neck area  EYES:Pupils equal and round ; no erythema; conjunctiva clear; sclera white; no nystagmus with head  shake.  NEURO: Alert and oriented, able to raise eyebrows symmetrical bilateral, smile with no facial droop, able to swallow  HEAD AND FACE: Symmetric facial features, no masses or lesions    Right ear examination: External ear normal.  EAC is clear.  TM intact with no effusion, retraction or perforation.  Left ear examination: External ear normal.  EAC is clear.  TM intact with no effusion, retraction or perforation.    Flowers midline  Rinne positive bilateral    Fukuda normal  Romberg normal  Tandem gait normal    NOSE: External nose midline; nasal turbinates normal no mucopus or polyps.  ORAL CAVITY: No lesions of external lips; uvula is midline; tongue with good mobility; no gross mass in oral cavity; mucosa appears pink   NECK/LYMPH: No obvious deformity or lesions; trachea is midline  PSYCH: Alert and oriented with appropriate mood and affect.      Assessment/Plan       Problem List Items Addressed This Visit    None  Visit Diagnoses       Benign paroxysmal positional vertigo of right ear    -  Primary    Relevant Orders    Referral to Audiology    Electronystagmography    V-Hit    Tinnitus of right ear            Based on patient HPI, she seemed to have BPPV.  This patient does not have audiogram on the visit today therefore I ordered a hearing test along with VNG and vHIT.  I will discuss results when these are completed plan for vestibular rehab for the BPPV.  She was also provided information in the discussion summary regarding management of BPPV at home.    This note was created using speech recognition transcription software. Despite proofreading, several typographical errors might be present that might affect the meaning of the content. Please call with any questions.         MELANY Tadeo-QUYNH 04/17/24 1:08 PM

## 2024-04-22 NOTE — PROGRESS NOTES
"ADULT BALANCE FUNCTION TEST (BFT)      Name:  Stephanie Flores  :  1980  Age:  43 y.o.  Date of Evaluation:  2024    IMPRESSIONS     Suspected bilateral peripheral vestibular involvement given the following: Overall low total SPV for both left and right caloric irrigations. Evidence of high frequency function given normal vHIT tracings. The vestibular system appears to be compensated physiologically and uncompensated functionally. While today's evaluation is indicative of bilateral involvement, interpret results with caution as rotational chair testing is considered the \"gold standard\" for diagnosis of bilateral involvement.     Additionally, positive evidence for active Benign Paroxysmal Positional Vertigo (BPPV) given the following: Presence of torsional up-beating nystagmus present in the head right position. Nystagmus decayed over time. Patient reported symptoms of dizziness. Reversal seen upon returning to sitting position. Indicative of right posterior canalithiasis. Additional down-beating nystagmus without torsional component in the head left position and following initial up-beating in head right position. Nystagmus did not decay over time. Indicative of possible multiple canal involvement (anterior cupulolithiasis). Recommend attempting Epley maneuver for relief of symptoms and possible Yacovino to address possible anterior canal component. Canalith repositioning performed today (i.e. Epley maneuver) before leaving today's appointment. Patient tolerated treatment well.    There were no indications of central vestibular system pathway involvement. Normal observation of gait and transfers. However given today's test results, patient is at risk of future falls.    RECOMMENDATIONS     Consider vestibular physical therapy to address uncompensated bilateral vestibulopathy. Emphasis on sensory substitution exercises to account for inadequate vestibular input, gaze stabilization exercises for VOR " "deficiencies, habituation exercises to triggers, and fall risk prevention. Additional management of BPPV recommended.   Consider re-evaluation as medically indicated.  Maintain a healthy lifestyle to help body function overall.  Continue monitoring per ENT/PCP preference.    Time: 8739-7927    HISTORY     Patient was seen for Balance Function Testing (BFT) due to a history of dizziness/imbalance. Vestibular case history collected via patient-clinician interview, patient chart review, and patient questionnaires.    Patient reported history of dizziness described as vertigo/spinning.  Symptoms began suddenly two years ago while laying down and turning over in bed.  Episodes last several seconds before subsiding.  Symptoms are provoked by turning head quickly, laying down in bed, sitting upright in bed, rolling over in bed, fast movements, looking up, and bending over. However notes symptoms can occur without triggering movements.  Overall the patient's symptoms have increased in severity over time. Of note, patient was previously given Meclizine without perceived benefit.  Relevant medical history includes unilateral right-sided tinnitus, thyroid cancer, breast cancer, and diabetes.  Most recent audiologic evaluation performed on 04/23/2024 by Clyde Cabrera CCC-A revealed normal hearing sensitivity, bilaterally. Tympanometry revealed normal eardrum mobility and canal volume, bilaterally.  Most recent vertigo evaluation performed on 04/17/2024 by MELANY Tadeo-CNP revealed normal functional testing including Fukuda, Romberg, and Tandem Gait.   Patient reported complying with pre-test instructions.      EVALUATION     See VNG & vHIT Raw Data in \"Media\"    TEST RESULTS     BEDSIDE ASSESSMENT TEST  The bedside assessment is an optional portion of the test battery to further assist in differential diagnosis and screen for eye abnormalities which may affect testing.    Deferred on this date due to late " arrival to appointment (25 minutes late).      VIDEONYSTAGMOGRAPHY (VNG) TEST  VNG provides objective indications of peripheral and central vestibulo-ocular pathway involvement. Ocular motor testing to visually guided targets is conducted using a dual channel video-recording technique for the recording of eye movement in the horizontal and vertical planes. Air caloric testing is performed at 48 degrees C and 24 degrees C.    Spontaneous Nystagmus test was absent. Spontaneous nystagmus testing may help with the identification of an acute or uncompensated peripheral vestibular lesion.   Gaze Nystagmus test was normal. Gaze nystagmus testing is to evaluate for nystagmus that is evoked by holding eye gaze in any particular direction. True gaze nystagmus is amplified when vision is denied.   Random Saccades test was normal. Random saccade testing is to evaluate patient's ability to make fast random eye movements along a horizontal moving target.   Smooth Pursuit/Tracking test was normal. Smooth pursuit/tracking testing is to evaluate the ability to move eyes with a single smoothly moving target.   Optokinetic nystagmus testing was normal at 40 d/s. This full-field OPK test is to evaluate the ability of central nervous system to stabilize vision during sustained head movement after the VOR system loses effectiveness.   [place]-Hallpike testing was abnormal given the presence of torsional up-beating nystagmus present in the head right position. Nystagmus decayed over time. Patient reported symptoms of dizziness. Reversal seen upon returning to sitting position. Indicative of right posterior canalithiasis. Additional down-beating nystagmus without torsional component in the head left and following initial up-beating in head right positions. Nystagmus did not decay over time. Indicative of possible multiple canal involvement (anterior cupulolithiasis). Jagdeep-Hallpike testing is to provide a diagnosis of Benign Paroxysmal  Positional Vertigo (BPPV) of the vertical semicircular canals on the side which is most affected.  Roll testing was normal. Of note, overlaid vertical canal BPPV response in tracings. Not true positional nystagmus. Roll testing is to provide a diagnosis of Benign Paroxysmal Positional Vertigo (BPPV) of the horizontal semicircular canals on the side which is most affected.  Positional testing was normal. Of note, overlaid vertical canal BPPV response in tracings. Not true positional nystagmus. Positional testing is to evaluate patient's ability to hold a steady gaze while in different positions.  Bithermal caloric testing was abnormal. Overall low total SPV for both left and right irrigations (total LE: 8, total RE: 7 d/s). Indicative of bilateral vestibulopathy.  Caloric testing is to evaluate for peripheral vestibular lesion.      VIDEO HEAD IMPULSE TEST (vHIT)  The vHIT procedure provides objective assessment of the high frequency vestibulo-ocular reflex (VOR) for each semicircular canal. Rapid, random horizontal and vertical thrusts are applied to the patient's head to provoke the VOR. The vHIT procedure includes two separate paradigms: Head Impulse Paradigm (HIMP) and Suppression Head Impulse Paradigm (SHIMP). SHIMP is an optional paradigm that is not appropriate to perform for every patient. However, it is appropriate to perform SHIMP when there is verified evidence of possible vestibulopathy in the traditional HIMP test.     Head Impulse Paradigm (HIMP)   Right Ear   Canal Gain Overt Saccades Covert Saccades   Lateral 1.07 absent absent   Anterior 0.89 absent absent   Posterior 0.93 absent absent        Head Impulse Paradigm (HIMP)   Left Ear   Canal Gain Overt Saccades Covert Saccades   Lateral 1.11 absent absent   Anterior 0.95 absent absent   Posterior 0.87 absent absent     Total gain for all canals tested was within normal limits  (<0.80 is abnormal for lateral, <0.70 is abnormal for vertical).  There was  no evidence of overt or covert saccades throughout testing      Testing and interpretation of results completed by Clyde Aj. It was my pleasure to evaluate this patient.       Clyde AjA CCDAWIT  Senior Clinical Vestibular Audiologist

## 2024-04-23 ENCOUNTER — CLINICAL SUPPORT (OUTPATIENT)
Dept: AUDIOLOGY | Facility: CLINIC | Age: 44
End: 2024-04-23
Payer: COMMERCIAL

## 2024-04-23 ENCOUNTER — DOCUMENTATION (OUTPATIENT)
Dept: ENDOCRINOLOGY | Facility: CLINIC | Age: 44
End: 2024-04-23

## 2024-04-23 ENCOUNTER — LAB (OUTPATIENT)
Dept: LAB | Facility: LAB | Age: 44
End: 2024-04-23

## 2024-04-23 DIAGNOSIS — H93.11 TINNITUS, RIGHT: ICD-10-CM

## 2024-04-23 DIAGNOSIS — R42 DIZZINESS: Primary | ICD-10-CM

## 2024-04-23 DIAGNOSIS — H81.11 BENIGN PAROXYSMAL POSITIONAL VERTIGO OF RIGHT EAR: ICD-10-CM

## 2024-04-23 DIAGNOSIS — C73 THYROID CANCER (MULTI): Primary | ICD-10-CM

## 2024-04-23 PROCEDURE — 92557 COMPREHENSIVE HEARING TEST: CPT | Performed by: AUDIOLOGIST

## 2024-04-23 PROCEDURE — 92550 TYMPANOMETRY & REFLEX THRESH: CPT | Performed by: AUDIOLOGIST

## 2024-04-23 NOTE — PROGRESS NOTES
"  ADULT AUDIOMETRIC EVALUATION    Name:  Stephanie Flores  :  1980  Age:  43 y.o.  Date of Evaluation:  2024    HISTORY:  Reason for visit: Ms. Flores is seen today for an evaluation of hearing. Patient was unaccompanied.    Patient reports approximate 2 year history of right sided tinnitus. She reports this has intensified since it started. She is also reporting that she will become vertiginous when she turns her head or rolls over in bed.    Denies: history of otologic surgery, otalgia, and otorrhea    EVALUATION:    See Audiogram and Immittance results under \"Media\".    RESULTS:     Otoscopic Evaluation:     RIGHT  Clear canal with tympanic membrane visualized    LEFT  Clear canal with tympanic membrane visualized    Immittance:   Immittance Measures: 226 Hz          Right Ear: Type A: Normal middle ear function         Left Ear:  Type A: Normal middle ear function    Reflexes and Reflex Decay:    Ipsilateral Reflexes (500-4000 Hz):          Probe/Stimulus Right Ear: present       Probe/Stimulus Left Ear: present    Otoacoustic Emissions [DP(OAEs)]:  Right Ear: DPOAEs present and robust 4932-2950 Hz consistent with normal to near normal outer hair cell function and hearing at those frequencies.   Left Ear: DPOAEs present and robust 9079-4536 Hz consistent with normal to near normal outer hair cell function and hearing at those frequencies.          Audiometry:  Test Technique: Standard Audiometry under insert phones.    Reliability: Good     Pure Tone Audiometry:    Right: Hearing levels within normal limits 125-8000 Hz   Left: Hearing levels within normal limits 125-8000 Hz    Speech Audiometry (via recorded, 25-words unless noted; M=masked):       Right Ear: Speech Reception Threshold (SRT) was obtained at 10 dBHL  Word Recognition Scores were Excellent (96%) in quiet when words were presented at 50 dBHL, using the NU-6 2A word list.  Left Ear: Speech Reception Threshold (SRT) was obtained at " 10 dBHL  Word Recognition Scores were Excellent (96%) in quiet when words were presented at 50 dBHL, using the NU-6 3A word list.    IMPRESSIONS:  Today's test results suggest normal middle ear function.    Normal hearing levels 125-8000 Hz in both ears with excellent word understanding.    PATIENT EDUCATION:   Stephanie Flores was counseled with regard to the findings.       PLAN:  Follow up with Dillon Mills CNP as directed.  Proceed with balance testing scheduled for tomorrow.  Retest hearing if concerns or changes arise.        Clyde Cabrera, CCC-A  Clinical Audiologist    Time: 8008-0932    This note was created using speech recognition transcription software. Despite proofreading, several typographical errors might be present that might affect the meaning of the content. Please call with any questions.     Degree of   Hearing Sensitivity dB Range   Within Normal Limits (WNL) 0 - 20   Slight 25   Mild 26 - 40   Moderate 41 - 55   Moderately-Severe 56 - 70   Severe 71 - 90   Profound 91 +     KEY  TM Tympanic Membrane   WNL Within Normal Limits   HA Hearing Aid   SNHL Sensorineural Hearing Loss   CHL Conductive Hearing Loss   NIHL Noise-Induced Hearing Loss   ECV Ear Canal Volume

## 2024-04-23 NOTE — PROGRESS NOTES
Most recent TSH is 35 with a TG of 12   Neck ultrasound is showing anterior neck lesion which is most likely thyroid tissue but unclear at this time if it remnant tissue vs malignancy    Plan:  CT of the neck  And most likely will proceed with IR guided biopsy

## 2024-04-23 NOTE — RESULT ENCOUNTER NOTE
Please let the patient know that there is a lesion on her neck and at this time it is unclear whether this is thyroid tissue for not, therefore I am ordering a CT of the neck to assess this and most likely we will proceed with a biopsy of this lesion    I am placing the orders in the system

## 2024-04-24 ENCOUNTER — CLINICAL SUPPORT (OUTPATIENT)
Dept: AUDIOLOGY | Facility: CLINIC | Age: 44
End: 2024-04-24
Payer: COMMERCIAL

## 2024-04-24 DIAGNOSIS — H81.11 BENIGN PAROXYSMAL POSITIONAL VERTIGO OF RIGHT EAR: ICD-10-CM

## 2024-04-24 PROCEDURE — 92540 BASIC VESTIBULAR EVALUATION: CPT

## 2024-04-24 PROCEDURE — 92537 CALORIC VSTBLR TEST W/REC: CPT

## 2024-04-24 PROCEDURE — 92700 UNLISTED ORL SERVICE/PX: CPT

## 2024-04-24 NOTE — PATIENT INSTRUCTIONS
BALANCE FUNCTION TEST (BFT)  AFTER VISIT SUMMARY      TESTING SUMMARY     The purpose of today's testing was to evaluate for any vestibular system (inner ear) involvement to account for your symptoms of dizziness/imbalance. Deep inside each of your ears, there are 5 balance organs which contribute to your ability to maintain balance and reduce dizziness. Our vestibular system involves 3 semicircular canals (“spinning detectors”) and 2 otolith organs (“gravity sensors”).    The most common cause of inner ear related dizziness is Benign Paroxysmal Positional Vertigo (BPPV). BPPV can be explained as:  Benign: not life-threatening  Paroxysmal: short-lasting, brief  Positional: triggered by head or body movements  Vertigo: sensation of spinning    BPPV is caused by displacement of calcium carbonate crystals in the inner ear. This is often caused by head injury, aging, diabetes, migraines, etc. When the crystals are displaced, they can become trapped in one of the inner ear's semicircular canals. When this occurs, certain head and body movements can cause the crystals to stimulate the nerve or send a false message to your brain that you are spinning when you are physically not. This spinning sensation can come with other symptoms including nausea, vomiting, disorientation, imbalance, or lightheadedness.    IMPRESSIONS     Based on today's evaluation, there is evidence of active BPPV. We treated you for this condition today. The treatment maneuver is performed in an effort to move the detached crystals out of the semicircular canal and back to the otolith organs where they belong. Once these crystals are out of the semicircular canals, they will no longer cause symptoms.     Additionally, your vestibular system appears to be weakened on both sides and possibly contributing as a source for your symptoms.    RECOMMENDATIONS     Continue medical follow up with Oscar Mills CNP.  Consider vestibular physical therapy to  address loss and management of BPPV.  Maintain a healthy lifestyle to help body function overall.    Testing and interpretation of results completed by Clyde Aj CCDAWIT. It was my pleasure to evaluate this patient.       Clyde Aj CCC-A CCDAWIT  Senior Clinical Vestibular Audiologist

## 2024-04-26 DIAGNOSIS — H81.11 BENIGN PAROXYSMAL POSITIONAL VERTIGO OF RIGHT EAR: Primary | ICD-10-CM

## 2024-04-26 NOTE — PROGRESS NOTES
I reviewed Audiology test results. Patient has BPPV. Proceed with Vestibular therapy. Order in place.

## 2024-04-30 ENCOUNTER — OFFICE VISIT (OUTPATIENT)
Dept: OPHTHALMOLOGY | Facility: CLINIC | Age: 44
End: 2024-04-30
Payer: COMMERCIAL

## 2024-04-30 DIAGNOSIS — H35.81 COTTON WOOL SPOTS: Primary | ICD-10-CM

## 2024-04-30 DIAGNOSIS — C73 PAPILLARY CARCINOMA OF THYROID (MULTI): ICD-10-CM

## 2024-04-30 DIAGNOSIS — H52.4 PRESBYOPIA OF BOTH EYES: ICD-10-CM

## 2024-04-30 DIAGNOSIS — E03.9 HYPOTHYROIDISM, UNSPECIFIED TYPE: Primary | ICD-10-CM

## 2024-04-30 DIAGNOSIS — E11.9 DIABETES MELLITUS TYPE 2 WITHOUT RETINOPATHY (MULTI): ICD-10-CM

## 2024-04-30 PROCEDURE — 92004 COMPRE OPH EXAM NEW PT 1/>: CPT | Performed by: OPTOMETRIST

## 2024-04-30 PROCEDURE — 92015 DETERMINE REFRACTIVE STATE: CPT | Performed by: OPTOMETRIST

## 2024-04-30 PROCEDURE — 92134 CPTRZ OPH DX IMG PST SGM RTA: CPT | Performed by: OPTOMETRIST

## 2024-04-30 ASSESSMENT — REFRACTION_MANIFEST
OD_CYLINDER: -0.25
OD_CYLINDER: -0.25
OD_AXIS: 085
OS_CYLINDER: -0.25
METHOD_AUTOREFRACTION: 1
OS_SPHERE: PLANO
OS_ADD: +1.25
OD_SPHERE: -0.50
OD_SPHERE: -0.25
OS_CYLINDER: -0.50
OD_AXIS: 090
OS_SPHERE: PLANO
OS_AXIS: 087
OS_AXIS: 075
OD_ADD: +1.25

## 2024-04-30 ASSESSMENT — CONF VISUAL FIELD
METHOD: COUNTING FINGERS
OS_NORMAL: 1
OD_INFERIOR_TEMPORAL_RESTRICTION: 0
OD_NORMAL: 1
OD_SUPERIOR_TEMPORAL_RESTRICTION: 0
OD_INFERIOR_NASAL_RESTRICTION: 0
OS_INFERIOR_NASAL_RESTRICTION: 0
OS_INFERIOR_TEMPORAL_RESTRICTION: 0
OD_SUPERIOR_NASAL_RESTRICTION: 0
OS_SUPERIOR_TEMPORAL_RESTRICTION: 0
OS_SUPERIOR_NASAL_RESTRICTION: 0

## 2024-04-30 ASSESSMENT — VISUAL ACUITY
OS_SC: J5
OS_SC+: -1
OD_SC: 20/20
OS_SC: 20/20
OD_SC: J5
METHOD: SNELLEN - LINEAR

## 2024-04-30 ASSESSMENT — ENCOUNTER SYMPTOMS
NEUROLOGICAL NEGATIVE: 0
ENDOCRINE NEGATIVE: 1
ALLERGIC/IMMUNOLOGIC NEGATIVE: 0
MUSCULOSKELETAL NEGATIVE: 0
HEMATOLOGIC/LYMPHATIC NEGATIVE: 0
EYES NEGATIVE: 1
RESPIRATORY NEGATIVE: 0
PSYCHIATRIC NEGATIVE: 0
CARDIOVASCULAR NEGATIVE: 0
GASTROINTESTINAL NEGATIVE: 0
CONSTITUTIONAL NEGATIVE: 0

## 2024-04-30 ASSESSMENT — EXTERNAL EXAM - LEFT EYE: OS_EXAM: NORMAL

## 2024-04-30 ASSESSMENT — TONOMETRY
OD_IOP_MMHG: 14
IOP_METHOD: GOLDMANN APPLANATION
OS_IOP_MMHG: 14

## 2024-04-30 ASSESSMENT — SLIT LAMP EXAM - LIDS
COMMENTS: MILD LAGOPHTHALMOS
COMMENTS: MILD LAGOPHTHALMOS

## 2024-04-30 ASSESSMENT — CUP TO DISC RATIO
OS_RATIO: .3
OD_RATIO: .45

## 2024-04-30 ASSESSMENT — EXTERNAL EXAM - RIGHT EYE: OD_EXAM: NORMAL

## 2024-05-07 ENCOUNTER — LAB (OUTPATIENT)
Dept: LAB | Facility: LAB | Age: 44
End: 2024-05-07
Payer: COMMERCIAL

## 2024-05-07 ENCOUNTER — HOSPITAL ENCOUNTER (OUTPATIENT)
Dept: RADIOLOGY | Facility: CLINIC | Age: 44
Discharge: HOME | End: 2024-05-07
Payer: COMMERCIAL

## 2024-05-07 DIAGNOSIS — E03.9 HYPOTHYROIDISM, UNSPECIFIED TYPE: ICD-10-CM

## 2024-05-07 DIAGNOSIS — C73 PAPILLARY CARCINOMA OF THYROID (MULTI): ICD-10-CM

## 2024-05-07 DIAGNOSIS — C73 THYROID CANCER (MULTI): ICD-10-CM

## 2024-05-07 LAB
T4 FREE SERPL-MCNC: 1.66 NG/DL (ref 0.78–1.48)
TSH SERPL-ACNC: 0.19 MIU/L (ref 0.44–3.98)

## 2024-05-07 PROCEDURE — 36415 COLL VENOUS BLD VENIPUNCTURE: CPT

## 2024-05-07 PROCEDURE — 84439 ASSAY OF FREE THYROXINE: CPT

## 2024-05-07 PROCEDURE — 84432 ASSAY OF THYROGLOBULIN: CPT

## 2024-05-07 PROCEDURE — 2550000001 HC RX 255 CONTRASTS: Performed by: STUDENT IN AN ORGANIZED HEALTH CARE EDUCATION/TRAINING PROGRAM

## 2024-05-07 PROCEDURE — 70491 CT SOFT TISSUE NECK W/DYE: CPT

## 2024-05-07 PROCEDURE — 84443 ASSAY THYROID STIM HORMONE: CPT

## 2024-05-07 PROCEDURE — 86800 THYROGLOBULIN ANTIBODY: CPT

## 2024-05-07 RX ADMIN — IOHEXOL 75 ML: 350 INJECTION, SOLUTION INTRAVENOUS at 13:12

## 2024-05-09 ENCOUNTER — TELEMEDICINE (OUTPATIENT)
Dept: PHARMACY | Facility: HOSPITAL | Age: 44
End: 2024-05-09
Payer: COMMERCIAL

## 2024-05-09 DIAGNOSIS — E11.9 DIABETES MELLITUS TYPE 2 WITHOUT RETINOPATHY (MULTI): ICD-10-CM

## 2024-05-09 LAB
BILL ONLY-THYROGLOBULIN: NORMAL
THYROGLOB AB SERPL-ACNC: <0.9 IU/ML (ref 0–4)
THYROGLOB SERPL-MCNC: 0.4 NG/ML (ref 1.3–31.8)
THYROGLOB SERPL-MCNC: ABNORMAL NG/ML (ref 1.3–31.8)

## 2024-05-09 RX ORDER — DULAGLUTIDE 1.5 MG/.5ML
1.5 INJECTION, SOLUTION SUBCUTANEOUS
Qty: 2 ML | Refills: 11 | Status: SHIPPED | OUTPATIENT
Start: 2024-05-12

## 2024-05-09 NOTE — PROGRESS NOTES
Clinical Pharmacy Team contacted met with Stephanie Flores regarding a consultation for diabetes management thanks to a referral from Dr. Betsy Young MD. Below is a summary of our conversation and recommendations:    Recommendations:  Increase trulicity to 1.5 mg subcutaneous once weekly  Decrease glipizide to 5 mg once daily   Continue all other DM medications as prescribed  Patient should continue to use CGM to monitor glucose  ________________________________________________________________________      No Known Allergies    Objective     There were no vitals taken for this visit.    Diabetes Pharmacotherapy:    Trulicity 0.75 mg subcutaneous once weekly  Glipizide 5 mg twice daily   Metformin 1000 mg twice daily      Lab Review  Lab Results   Component Value Date    BILITOT 0.4 04/06/2022    CALCIUM 8.8 03/06/2024    CO2 24 03/06/2024     03/06/2024    CREATININE 0.78 03/06/2024    GLUCOSE 294 (H) 03/06/2024    ALKPHOS 96 04/06/2022    K 4.3 03/06/2024    PROT 6.9 04/06/2022     03/06/2024    AST 16 04/06/2022    ALT 15 04/06/2022    BUN 9 03/06/2024    ANIONGAP 13 03/06/2024    PHOS 4.0 03/06/2024    ALBUMIN 3.8 03/06/2024    GFRF >90 04/06/2022     Lab Results   Component Value Date    TRIG 216 (H) 03/06/2024    CHOL 188 03/06/2024    LDLCALC 85 03/06/2024    HDL 59.5 03/06/2024     Lab Results   Component Value Date    HGBA1C 10.1 (H) 03/06/2024    HGBA1C 10.0 (A) 04/06/2022    HGBA1C 11.1 07/26/2021     The 10-year ASCVD risk score (Sami MERCADO, et al., 2019) is: 2%    Values used to calculate the score:      Age: 43 years      Sex: Female      Is Non- : Yes      Diabetic: Yes      Tobacco smoker: No      Systolic Blood Pressure: 127 mmHg      Is BP treated: No      HDL Cholesterol: 59.5 mg/dL      Total Cholesterol: 188 mg/dL      Monitoring         Assessment/Plan     The patient reports today for a diabetes consultation. Discussed DM regimen with the patient. Reviewed  CGM report and discussed it with the patient. TIR is at goal. Based on glycemic trends recommend increasing trulicity to 1.5 mg subcutaneous once weekly and decreasing glipizide to 5 mg once daily. May be able to completely stop glipizide on follow up but will assess. Patient was agreeable to this plan.         PATIENT EDUCATION/GOALS    Goals  Fasting B - 130 mg/dL  Postprandial BG: less than 180 mg/dL  A1c: less than 7%    Type of encounter: [ virtual ]    Provided counseling on lifestyle modifications, medications, and self-monitoring. Patient has no additional questions at this time. Pharmacy to follow up in 6-8 weeks. Please reach out with any questions. Thank you.       Angela Sosa, PharmD    Continue all meds under the continuation of care with the referring provider and clinical pharmacy team.

## 2024-05-10 ENCOUNTER — OFFICE VISIT (OUTPATIENT)
Dept: OPHTHALMOLOGY | Facility: CLINIC | Age: 44
End: 2024-05-10
Payer: COMMERCIAL

## 2024-05-10 ENCOUNTER — LAB (OUTPATIENT)
Dept: LAB | Facility: LAB | Age: 44
End: 2024-05-10
Payer: COMMERCIAL

## 2024-05-10 DIAGNOSIS — H18.832 RECURRENT EROSION OF LEFT CORNEA: ICD-10-CM

## 2024-05-10 DIAGNOSIS — H34.8110 CENTRAL RETINAL VEIN OCCLUSION WITH MACULAR EDEMA OF RIGHT EYE (CMS-HCC): ICD-10-CM

## 2024-05-10 DIAGNOSIS — H18.832 RECURRENT EROSION OF LEFT CORNEA: Primary | ICD-10-CM

## 2024-05-10 DIAGNOSIS — E11.9 DIABETES MELLITUS TYPE 2 WITHOUT RETINOPATHY (MULTI): ICD-10-CM

## 2024-05-10 LAB
ALBUMIN SERPL BCP-MCNC: 3.4 G/DL (ref 3.4–5)
ALP SERPL-CCNC: 78 U/L (ref 33–110)
ALT SERPL W P-5'-P-CCNC: 10 U/L (ref 7–45)
ANION GAP SERPL CALC-SCNC: 12 MMOL/L (ref 10–20)
AST SERPL W P-5'-P-CCNC: 8 U/L (ref 9–39)
BASOPHILS # BLD MANUAL: 0 X10*3/UL (ref 0–0.1)
BASOPHILS NFR BLD MANUAL: 0 %
BILIRUB SERPL-MCNC: 0.3 MG/DL (ref 0–1.2)
BUN SERPL-MCNC: 7 MG/DL (ref 6–23)
CALCIUM SERPL-MCNC: 7.8 MG/DL (ref 8.6–10.6)
CHLORIDE SERPL-SCNC: 108 MMOL/L (ref 98–107)
CO2 SERPL-SCNC: 24 MMOL/L (ref 21–32)
CREAT SERPL-MCNC: 0.55 MG/DL (ref 0.5–1.05)
CRP SERPL-MCNC: 0.35 MG/DL
EGFRCR SERPLBLD CKD-EPI 2021: >90 ML/MIN/1.73M*2
EOSINOPHIL # BLD MANUAL: 0.13 X10*3/UL (ref 0–0.7)
EOSINOPHIL NFR BLD MANUAL: 1.8 %
ERYTHROCYTE [DISTWIDTH] IN BLOOD BY AUTOMATED COUNT: 21.2 % (ref 11.5–14.5)
ERYTHROCYTE [SEDIMENTATION RATE] IN BLOOD BY WESTERGREN METHOD: 12 MM/H (ref 0–20)
GLUCOSE SERPL-MCNC: 182 MG/DL (ref 74–99)
HCT VFR BLD AUTO: 22.4 % (ref 36–46)
HERPES SIMPLEX VIRUS 1 IGG: <0.2 INDEX
HERPES SIMPLEX VIRUS 2 IGG: 3.6 INDEX
HGB BLD-MCNC: 6.4 G/DL (ref 12–16)
HYPOCHROMIA BLD QL SMEAR: NORMAL
IMM GRANULOCYTES # BLD AUTO: 0.03 X10*3/UL (ref 0–0.7)
IMM GRANULOCYTES NFR BLD AUTO: 0.4 % (ref 0–0.9)
LYMPHOCYTES # BLD MANUAL: 1.66 X10*3/UL (ref 1.2–4.8)
LYMPHOCYTES NFR BLD MANUAL: 23 %
MCH RBC QN AUTO: 17.7 PG (ref 26–34)
MCHC RBC AUTO-ENTMCNC: 28.6 G/DL (ref 32–36)
MCV RBC AUTO: 62 FL (ref 80–100)
MONOCYTES # BLD MANUAL: 0.13 X10*3/UL (ref 0.1–1)
MONOCYTES NFR BLD MANUAL: 1.8 %
NEUTS SEG # BLD MANUAL: 5.28 X10*3/UL (ref 1.2–7)
NEUTS SEG NFR BLD MANUAL: 73.4 %
NRBC BLD-RTO: 0 /100 WBCS (ref 0–0)
PLATELET # BLD AUTO: 345 X10*3/UL (ref 150–450)
PLATELET CLUMP BLD QL SMEAR: PRESENT
POTASSIUM SERPL-SCNC: 3.8 MMOL/L (ref 3.5–5.3)
PROT SERPL-MCNC: 6.5 G/DL (ref 6.4–8.2)
RBC # BLD AUTO: 3.61 X10*6/UL (ref 4–5.2)
RBC MORPH BLD: NORMAL
RHEUMATOID FACT SER NEPH-ACNC: <10 IU/ML (ref 0–15)
SODIUM SERPL-SCNC: 140 MMOL/L (ref 136–145)
TOTAL CELLS COUNTED BLD: 113
TREPONEMA PALLIDUM IGG+IGM AB [PRESENCE] IN SERUM OR PLASMA BY IMMUNOASSAY: NONREACTIVE
URATE SERPL-MCNC: 3.2 MG/DL (ref 2.3–6.7)
WBC # BLD AUTO: 7.2 X10*3/UL (ref 4.4–11.3)

## 2024-05-10 PROCEDURE — 86431 RHEUMATOID FACTOR QUANT: CPT | Performed by: OPHTHALMOLOGY

## 2024-05-10 PROCEDURE — 85007 BL SMEAR W/DIFF WBC COUNT: CPT | Performed by: OPHTHALMOLOGY

## 2024-05-10 PROCEDURE — 85027 COMPLETE CBC AUTOMATED: CPT | Performed by: OPHTHALMOLOGY

## 2024-05-10 PROCEDURE — 86481 TB AG RESPONSE T-CELL SUSP: CPT

## 2024-05-10 PROCEDURE — 3048F LDL-C <100 MG/DL: CPT | Performed by: OPHTHALMOLOGY

## 2024-05-10 PROCEDURE — 82164 ANGIOTENSIN I ENZYME TEST: CPT

## 2024-05-10 PROCEDURE — 84075 ASSAY ALKALINE PHOSPHATASE: CPT | Performed by: OPHTHALMOLOGY

## 2024-05-10 PROCEDURE — 3046F HEMOGLOBIN A1C LEVEL >9.0%: CPT | Performed by: OPHTHALMOLOGY

## 2024-05-10 PROCEDURE — 86695 HERPES SIMPLEX TYPE 1 TEST: CPT | Performed by: OPHTHALMOLOGY

## 2024-05-10 PROCEDURE — 84550 ASSAY OF BLOOD/URIC ACID: CPT | Performed by: OPHTHALMOLOGY

## 2024-05-10 PROCEDURE — 3061F NEG MICROALBUMINURIA REV: CPT | Performed by: OPHTHALMOLOGY

## 2024-05-10 PROCEDURE — 86036 ANCA SCREEN EACH ANTIBODY: CPT

## 2024-05-10 PROCEDURE — 86780 TREPONEMA PALLIDUM: CPT | Performed by: OPHTHALMOLOGY

## 2024-05-10 PROCEDURE — 86140 C-REACTIVE PROTEIN: CPT | Performed by: OPHTHALMOLOGY

## 2024-05-10 PROCEDURE — 1036F TOBACCO NON-USER: CPT | Performed by: OPHTHALMOLOGY

## 2024-05-10 PROCEDURE — 85652 RBC SED RATE AUTOMATED: CPT | Performed by: OPHTHALMOLOGY

## 2024-05-10 PROCEDURE — 86038 ANTINUCLEAR ANTIBODIES: CPT | Performed by: OPHTHALMOLOGY

## 2024-05-10 PROCEDURE — 99214 OFFICE O/P EST MOD 30 MIN: CPT | Performed by: OPHTHALMOLOGY

## 2024-05-10 PROCEDURE — 83516 IMMUNOASSAY NONANTIBODY: CPT

## 2024-05-10 ASSESSMENT — CONF VISUAL FIELD
OS_SUPERIOR_NASAL_RESTRICTION: 0
OS_INFERIOR_TEMPORAL_RESTRICTION: 0
OS_NORMAL: 1
OD_SUPERIOR_TEMPORAL_RESTRICTION: 0
OD_INFERIOR_NASAL_RESTRICTION: 0
OD_SUPERIOR_NASAL_RESTRICTION: 0
OS_SUPERIOR_TEMPORAL_RESTRICTION: 0
OS_INFERIOR_NASAL_RESTRICTION: 0
OD_NORMAL: 1
METHOD: COUNTING FINGERS
OD_INFERIOR_TEMPORAL_RESTRICTION: 0

## 2024-05-10 ASSESSMENT — VISUAL ACUITY
OS_SC+: -2
OD_SC: 20/30
OS_SC: 20/25
METHOD: SNELLEN - LINEAR
OD_SC+: +2

## 2024-05-10 ASSESSMENT — TONOMETRY
OS_IOP_MMHG: 15
OD_IOP_MMHG: 14
IOP_METHOD: GOLDMANN APPLANATION

## 2024-05-10 ASSESSMENT — EXTERNAL EXAM - LEFT EYE: OS_EXAM: NORMAL

## 2024-05-10 ASSESSMENT — CUP TO DISC RATIO
OD_RATIO: .45
OS_RATIO: .3

## 2024-05-10 ASSESSMENT — SLIT LAMP EXAM - LIDS
COMMENTS: MILD LAGOPHTHALMOS
COMMENTS: MILD LAGOPHTHALMOS

## 2024-05-10 ASSESSMENT — ENCOUNTER SYMPTOMS: EYES NEGATIVE: 1

## 2024-05-10 ASSESSMENT — EXTERNAL EXAM - RIGHT EYE: OD_EXAM: NORMAL

## 2024-05-10 NOTE — PROGRESS NOTES
HISTORY    Diabetes mellitus (DM) II with high blood sugar. 2011. Has vertigo and Dx of BBPV. Had a recent ENT appointment. Hx of Breast cancer 2010. Thyroid cancer 2014. Has a mass in her neck that is being looked at with CT scan next week. Endocrinologist is following.    Multiple cotton wool spots. More focused around optic nerve and involving optic nerve OD.  Optical coherence tomography of the macula revealed:   OD: Normal foveal contour, photoreceptor, retinal pigment epithelium, IS/OS junction, central field 235micron.  Vitreous hyaloid base visualized.  Findings are normal.  OS:  Normal foveal contour, photoreceptor, retinal pigment epithelium, IS/OS junction, central field 297 micron.  Vitreous hyaloid base visualized.  Findings are normal.     Retinal multimodal imaging including photography was completed, and the findings are described in the examination. Cotton wool spots.    Optic nerve RNFL edema OD>OS.  Optical coherence tomography of the retinal nerve fiber layer (RNFL) revealed:   OD: Elevated thickness in superior sectors with an average RNFL thickness of 126 micron.  OS: Normal thickness in all four sectors with an average RNFL thickness of 102 micron.     Patient does not have HTN/HIV/SLE/collagen vascular disorders.     TODAY    1 central retinal vein occlusion (CRVO) no cystoid macular edema (CME) right eye  Will need investigations   for vasculitis    Hemoglobin low  Called patient advised go to ER ASAP  Pt agrees on phone

## 2024-05-12 LAB
NIL(NEG) CONTROL SPOT COUNT: NORMAL
PANEL A SPOT COUNT: 0
PANEL B SPOT COUNT: 0
POS CONTROL SPOT COUNT: NORMAL
T-SPOT. TB INTERPRETATION: NEGATIVE

## 2024-05-12 NOTE — PROGRESS NOTES
Physical Therapy    Physical Therapy Evaluation and Treatment      Patient Name: Stephanie Flores  MRN: [id number]  Today's Date: 5/14/2024  Time Calculation  Start Time: 0100  Stop Time: 0200  Time Calculation (min): 60 min  PT Evaluation Time Entry  PT Evaluation (Low) Time Entry: 30  PT Therapeutic Procedures Time Entry  Manual Therapy Time Entry: 10  PT Modalities Time Entry  Canalith Repositioning Time Entry: 15  Visit number: 1  Insurance:  [facility]  Authorized visits:  PENDING  Authorization end date:  PENDING  Primary diagnosis:  BPPV Right side    Assessment:  PT Assessment  PT Assessment Results: Decreased range of motion, Impaired balance, Decreased mobility, Pain  Rehab Prognosis: Good     Patient presents to therapy with a history of dizziness since for 2 years.  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  The patient has decreased gait, ROM, balance, transfers, ambulation, increased fall risk, dizziness, lightheadedness, and overall decreased quality of life.  The patient can benefit from skilled physical therapy to address her deficits and maximize the quality of her life.     Plan:  OP PT Plan  Treatment/Interventions: Canalith repositioning, Education/ Instruction, Manual therapy, Neuromuscular re-education, Therapeutic activities, Therapeutic exercises  PT Plan: Skilled PT  PT Frequency: 1 time per week  Duration: 12 weeks  Onset Date: 04/17/24  Rehab Potential: Good  Plan of Care Agreement: Patient    Current Problem:   1. Benign paroxysmal positional vertigo of right ear  Referral to Physical Therapy    Follow Up In Physical Therapy      2. Dizziness  Follow Up In Physical Therapy      3. Cervicalgia  Follow Up In Physical Therapy      4. Imbalance  Follow Up In Physical Therapy          Subjective    Patient states that dizziness better since Shayma.  Patient continues to have dizziness with looking up and  "bending.  Dizziness currently 0/10, last week 0-4/10 lasting for seconds.  Functional limitation include cleaning, laundry, getting out of bed, rolling to right in bed, and reaching overhead.  Patient may have had COVID around the time of start of symptoms.    General:  General  Reason for Referral: Vertigo  Referred By: [place]  Preferred Learning Style: auditory, kinesthetic, verbal, visual, written    PER FINA DURAN NOTE DATED 4/24/2024:    Suspected bilateral peripheral vestibular involvement given the following: Overall low total SPV for both left and right caloric irrigations. Evidence of high frequency function given normal vHIT tracings. The vestibular system appears to be compensated physiologically and uncompensated functionally. While today's evaluation is indicative of bilateral involvement, interpret results with caution as rotational chair testing is considered the \"gold standard\" for diagnosis of bilateral involvement.      Additionally, positive evidence for active Benign Paroxysmal Positional Vertigo (BPPV) given the following: Presence of torsional up-beating nystagmus present in the head right position. Nystagmus decayed over time. Patient reported symptoms of dizziness. Reversal seen upon returning to sitting position. Indicative of right posterior canalithiasis. Additional down-beating nystagmus without torsional component in the head left position and following initial up-beating in head right position. Nystagmus did not decay over time. Indicative of possible multiple canal involvement (anterior cupulolithiasis). Recommend attempting Epley maneuver for relief of symptoms and possible Yacovino to address possible anterior canal component. Canalith repositioning performed today (i.e. Epley maneuver) before leaving today's appointment. Patient tolerated treatment well.     There were no indications of central vestibular system pathway involvement. Normal observation of gait and " transfers. However given today's test results, patient is at risk of future falls.      PER TOMY TAVERAS NOTE DATED 4/17/2024:  Patient has a history of type 2 diabetes, goiter, thyroid cancer status post chemo and radiation, obesity and vertigo.     Patient recalls vertigo started about 2 years ago.  She was laying down and turned that the vertigo started.  She states that she knows it is coming from her right ear as this is also the ear that is affected with tinnitus but no significant hearing loss.  She describes vertigo as brief and induced with turning head quickly, laying down in bed,  getting up from laying down and bending over.  This occurs mostly daily.  She was evaluated in the ED before and was prescribed meclizine and Valium which does not help.  Her tinnitus is constantly present.  She describes it as high-pitched noise predominantly right ear.          She denies any other history of ear surgery or trauma to the ears.  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  No family history of ear disease and no exposure to ototoxic medications.  No complaints of drainage or ear pain.     When asked about ear pain, headache, phono-photophobia, visual or motion intolerance, sound or pressure induced symptoms, hearing loss, discharge from ear,, aural fullness or autophony, the patient admits to none.    Based on patient HPI, she seemed to have BPPV.  This patient does not have audiogram on the visit today therefore I ordered a hearing test along with VNG and vHIT.  I will discuss results when these are completed plan for vestibular rehab for the BPPV.  She was also provided information in the discussion summary regarding management of BPPV at home.     Precautions:  Precautions  STEADI Fall Risk Score (The score of 4 or more indicates an increased risk of falling): 4  Vital Signs:     Pain:  Pain Assessment  Pain Assessment: 0-10 (neck pain currently 0/10 last week 0-5/10)    Prior Level of  Function:     Independent without dizziness.    Objective   CERVICAL ROM:    AROM    EXTENSION 45    FLEXION 45    RIGHT ROTATION 50    LEFT ROTATION 30    RIGHT LATERAL FLEXION 35    LEFT LATERAL FLEXION 30 Left tight     CERVICAL SPECIAL TEST:  TRANSVERSE LIGAMENT  NEG   SHARP-IBETH NEG    VERTEBRAL ARTERY SUBJECTIVE REPORTING FOR THE FOLLOWING:     NEGATIVE POSITIVE   DYPLOPIA X    DYSARTHRIA X    DYSPHAGIA X    NYSTAGMUS X    DROP ATTACK X    NAUSEA X    FACIAL NUMBNESS X    DIZZINESS X        OCULAR MOTOR EXAM:   NORMAL ABNORMAL   RANGE OF MOTION X    SMOOTH PURSUIT  X    HORIZONTAL SACCADES X    VERTICAL SACCADES X      ALL OF THE BELOW WITH GOGGLES UNLESS OTHERWISE NOTED:  SPONTANEOUS Left beat   GAZE EVOKED All directions in direction of gaze     POSITIONAL TESTING:  POSITION NYSTAGMUS DIRECTION DIZZINESS DURATION(SECONDS)   HEAD CENTER Down beating 0/10    ROLL RIGHT Down beat with right torsional 4/10    ROLL LEFT Down beating with left torsional 4/10    GIULIA HAL[place] RIGHT RIGHT Down BEAT TORSIONAL NYSTAGMUS 4/10    GIULIA [place] LEFT LEFT Down BEAT TORSIONAL NYSTAGMUS 4/10      STATIC and DYNAMIC BALANCE:  Will be assessed in the future    Outcome Measures:  DHI:      Treatments:  CRM    Deep Epley on right side due to history of right posterior canal BPPV  Head hang for anterior canal BPPV patient with right upbeat torsional nystagmus upon return to sit.    Manual therapy:  15  Cross fiber right > left upper trap, levator scap and posterior scalenes    Precautions sleep propped up 20 degrees, sleep on left right side or back, avoid bending and head movement for the next 24 hours.      EDUCATION:  Outpatient Education  Individual(s) Educated: Patient  Education Provided: POC, Home Exercise Program  Patient/Caregiver Demonstrated Understanding: yes  Plan of Care Discussed and Agreed Upon: yes  Patient Response to Education: Patient/Caregiver Verbalized Understanding of Information    Goals:  Active       PT  Problem       PT Goal 1:  Patient will report 0-2/10 dizziness with bed mobility to decrease risk of falling.        Start:  05/14/24    Expected End:  08/12/24            PT Goal 2:  Patient decrease neck pain to 0-2/10 to allow increased ease in scanning the environment.        Start:  05/14/24    Expected End:  08/12/24            PT Goal 3:  Patient will increase cervical rotation to 0-55 degrees to allow the patient to scan environment without pain.        Start:  05/14/24    Expected End:  08/12/24            PT Goal 4:  Patient decrease DHI to 20 points for self reported improvement in function.        Start:  05/14/24    Expected End:  08/12/24            PT Goal 5:  Patient will be independent in Home Exercise Program to manage symptoms and maximize their functional independence.        Start:  05/14/24    Expected End:  08/12/24            Patient Stated Goal 1:  to get rid of dizziness       Start:  05/14/24    Expected End:  08/12/24

## 2024-05-13 LAB — ACE SERPL-CCNC: 40 U/L (ref 16–85)

## 2024-05-14 ENCOUNTER — CLINICAL SUPPORT (OUTPATIENT)
Dept: PHYSICAL THERAPY | Facility: CLINIC | Age: 44
End: 2024-05-14
Payer: COMMERCIAL

## 2024-05-14 DIAGNOSIS — M54.2 CERVICALGIA: ICD-10-CM

## 2024-05-14 DIAGNOSIS — E61.1 IRON DEFICIENCY: Primary | ICD-10-CM

## 2024-05-14 DIAGNOSIS — H81.11 BENIGN PAROXYSMAL POSITIONAL VERTIGO OF RIGHT EAR: Primary | ICD-10-CM

## 2024-05-14 DIAGNOSIS — R26.89 IMBALANCE: ICD-10-CM

## 2024-05-14 DIAGNOSIS — R42 DIZZINESS: ICD-10-CM

## 2024-05-14 LAB
ANCA AB PATTERN SER IF-IMP: NORMAL
ANCA IGG TITR SER IF: NORMAL {TITER}
MYELOPEROXIDASE AB SER-ACNC: 0 AU/ML (ref 0–19)
PROTEINASE3 AB SER-ACNC: 0 AU/ML (ref 0–19)

## 2024-05-14 PROCEDURE — 97161 PT EVAL LOW COMPLEX 20 MIN: CPT | Mod: GP | Performed by: PHYSICAL THERAPIST

## 2024-05-14 PROCEDURE — 97140 MANUAL THERAPY 1/> REGIONS: CPT | Mod: GP | Performed by: PHYSICAL THERAPIST

## 2024-05-14 PROCEDURE — 95992 CANALITH REPOSITIONING PROC: CPT | Mod: GP | Performed by: PHYSICAL THERAPIST

## 2024-05-14 RX ORDER — FERROUS SULFATE 325(65) MG
1 TABLET ORAL
Qty: 180 TABLET | Refills: 3 | Status: SHIPPED | OUTPATIENT
Start: 2024-05-14

## 2024-05-14 ASSESSMENT — PAIN - FUNCTIONAL ASSESSMENT: PAIN_FUNCTIONAL_ASSESSMENT: 0-10

## 2024-05-14 ASSESSMENT — ENCOUNTER SYMPTOMS
OCCASIONAL FEELINGS OF UNSTEADINESS: 1
LOSS OF SENSATION IN FEET: 0
DEPRESSION: 1

## 2024-05-14 NOTE — PROGRESS NOTES
CT of neck reviewed     Not a good study due to motion artifact there is soft tissue in the anterior neck and this is measured to be at 0.8cm in size, of note measurements were bigger with ultrasound.  This might be too small to biopsy at this time     Options would be to reassess of growth with another imaging vs to do a thyroid uptake scan   Would defer this to the primary Endocrinologist, they can discuss this at follow up     Spoke with the patient who is agreeable with monitoring

## 2024-05-15 LAB — ANA SER QL HEP2 SUBST: NEGATIVE

## 2024-05-24 ENCOUNTER — TREATMENT (OUTPATIENT)
Dept: PHYSICAL THERAPY | Facility: CLINIC | Age: 44
End: 2024-05-24
Payer: COMMERCIAL

## 2024-05-24 DIAGNOSIS — H81.11 BENIGN PAROXYSMAL POSITIONAL VERTIGO OF RIGHT EAR: ICD-10-CM

## 2024-05-24 DIAGNOSIS — R42 DIZZINESS: Primary | ICD-10-CM

## 2024-05-24 DIAGNOSIS — M54.2 CERVICALGIA: ICD-10-CM

## 2024-05-24 DIAGNOSIS — R26.89 IMBALANCE: ICD-10-CM

## 2024-05-24 PROCEDURE — 95992 CANALITH REPOSITIONING PROC: CPT | Mod: GP | Performed by: PHYSICAL THERAPIST

## 2024-05-24 PROCEDURE — 97140 MANUAL THERAPY 1/> REGIONS: CPT | Mod: GP,59 | Performed by: PHYSICAL THERAPIST

## 2024-05-24 ASSESSMENT — PAIN - FUNCTIONAL ASSESSMENT: PAIN_FUNCTIONAL_ASSESSMENT: 0-10

## 2024-05-24 ASSESSMENT — PAIN SCALES - GENERAL: PAINLEVEL_OUTOF10: 0 - NO PAIN

## 2024-05-24 NOTE — PROGRESS NOTES
Physical Therapy    Physical Therapy Treatment    Patient Name: Stephanie Flores  MRN: [id number]    Today's Date: 5/24/2024        PT Therapeutic Procedures Time Entry  Manual Therapy Time Entry: 25  PT Modalities Time Entry  Canalith Repositioning Time Entry: 15             Assessment:     Patient with continued BPPV this date.  Manual therapy for cervical tone.    Plan:     Check for BPPV.  Start cervical stretching     Current Problem  1. Dizziness  Follow Up In Physical Therapy      2. Benign paroxysmal positional vertigo of right ear  Follow Up In Physical Therapy      3. Cervicalgia  Follow Up In Physical Therapy      4. Imbalance  Follow Up In Physical Therapy        Subjective    Patient reports dizziness yesterday with bending over to tie shoes.  Denies dizziness any other time.    Pain       Objective   Treatments:    5/24/2024  CRM: 15 min  HC down beat  Roll right no dizziness no symptoms  Roll Left   [place] Halpike left down beat 3  [place] Halpike right down beat 6  Deep epley one time  Semont for right side BPPV in anterior canal    Manual therapy: 25  Trigger point to upper trap, levator scap, posterior scalenes, and semi spinalis      EVAL:  CRM    Deep Epley on right side due to history of right posterior canal BPPV  Head hang for anterior canal BPPV patient with right upbeat torsional nystagmus upon return to sit.    Manual therapy:  15  Cross fiber right > left upper trap, levator scap and posterior scalenes    Precautions sleep propped up 20 degrees, sleep on left right side or back, avoid bending and head movement for the next 24 hours.  OP EDUCATION:       Goals:  Active       PT Problem       PT Goal 1:  Patient will report 0-2/10 dizziness with bed mobility to decrease risk of falling.        Start:  05/14/24    Expected End:  08/12/24            PT Goal 2:  Patient decrease neck pain to 0-2/10 to allow increased ease in scanning the environment.        Start:  05/14/24    Expected End:  08/12/24             PT Goal 3:  Patient will increase cervical rotation to 0-55 degrees to allow the patient to scan environment without pain.        Start:  05/14/24    Expected End:  08/12/24            PT Goal 4:  Patient decrease DHI to 20 points for self reported improvement in function.        Start:  05/14/24    Expected End:  08/12/24            PT Goal 5:  Patient will be independent in Home Exercise Program to manage symptoms and maximize their functional independence.        Start:  05/14/24    Expected End:  08/12/24            Patient Stated Goal 1:  to get rid of dizziness       Start:  05/14/24    Expected End:  08/12/24

## 2024-06-06 ENCOUNTER — APPOINTMENT (OUTPATIENT)
Dept: PHYSICAL THERAPY | Facility: CLINIC | Age: 44
End: 2024-06-06
Payer: COMMERCIAL

## 2024-06-09 NOTE — PROGRESS NOTES
"Physical Therapy    Physical Therapy Treatment    Patient Name: Stephanie Flroes  MRN: [id number]    Today's Date: 5/24/2024                                Visit number: 4 of 24   Insurance:  [facility]  Authorized visits:  24  Authorization end date:  11/15/2024  Primary diagnosis:  BPPV    Assessment:     Patient with no dizziness since last visit.  Patient independent with HEP and is agreeable to stopping therapy this date.      Plan:     Discharge    Current Problem  1. Dizziness  Follow Up In Physical Therapy      2. Benign paroxysmal positional vertigo of right ear  Follow Up In Physical Therapy      3. Cervicalgia  Follow Up In Physical Therapy      4. Imbalance  Follow Up In Physical Therapy          Subjective    Patient states that she has not had any dizziness.  Patient states that she is remembering to breathing.  Patient states that she thinks she is ready to be done with therapy today.    Pain  Pain Assessment  Pain Assessment: 0-10  Pain Score: 0 - No pain    Objective   Treatments:  6/10/2024: 15  min  Therapeutic exercises: 10 min   Levator scap 3 times 30\" *  Upper trap 3 times 30\" *     Manual therapy 25 min  Cross fiber to levator scap, semispinalis, rectus capitis, splenis captitus and semispinalis along occipital region.      5/24/2024  CRM: 15 min  HC down beat  Roll right no dizziness no symptoms  Roll Left   Jagdeep Halpike left down beat 3  [place] Halpike right down beat 6  Deep epley one time  Semont for right side BPPV in anterior canal    Manual therapy: 25  Trigger point to upper trap, levator scap, posterior scalenes, and semi spinalis      EVAL:  CRM    Deep Epley on right side due to history of right posterior canal BPPV  Head hang for anterior canal BPPV patient with right upbeat torsional nystagmus upon return to sit.    Manual therapy:  15  Cross fiber right > left upper trap, levator scap and posterior scalenes    Precautions sleep propped up 20 degrees, sleep on left right side or back, " avoid bending and head movement for the next 24 hours.  OP EDUCATION:       Goals:  Active       PT Problem       PT Goal 1:  Patient will report 0-2/10 dizziness with bed mobility to decrease risk of falling.  (Met)       Start:  05/14/24    Expected End:  08/12/24    Resolved:  06/10/24         PT Goal 2:  Patient decrease neck pain to 0-2/10 to allow increased ease in scanning the environment.  (Met)       Start:  05/14/24    Expected End:  08/12/24    Resolved:  06/10/24         PT Goal 3:  Patient will increase cervical rotation to 0-55 degrees to allow the patient to scan environment without pain.  (Met)       Start:  05/14/24    Expected End:  08/12/24    Resolved:  06/10/24      Goal Note       0-60 rot              PT Goal 4:  Patient decrease DHI to 20 points for self reported improvement in function.        Start:  05/14/24    Expected End:  08/12/24            PT Goal 5:  Patient will be independent in Home Exercise Program to manage symptoms and maximize their functional independence.  (Met)       Start:  05/14/24    Expected End:  08/12/24    Resolved:  06/10/24         Patient Stated Goal 1:  to get rid of dizziness (Met)       Start:  05/14/24    Expected End:  08/12/24    Resolved:  06/10/24

## 2024-06-10 ENCOUNTER — TREATMENT (OUTPATIENT)
Dept: PHYSICAL THERAPY | Facility: CLINIC | Age: 44
End: 2024-06-10
Payer: COMMERCIAL

## 2024-06-10 DIAGNOSIS — R26.89 IMBALANCE: ICD-10-CM

## 2024-06-10 DIAGNOSIS — H81.11 BENIGN PAROXYSMAL POSITIONAL VERTIGO OF RIGHT EAR: ICD-10-CM

## 2024-06-10 DIAGNOSIS — R42 DIZZINESS: ICD-10-CM

## 2024-06-10 DIAGNOSIS — M54.2 CERVICALGIA: ICD-10-CM

## 2024-06-10 PROCEDURE — 97110 THERAPEUTIC EXERCISES: CPT | Mod: GP | Performed by: PHYSICAL THERAPIST

## 2024-06-10 PROCEDURE — 97140 MANUAL THERAPY 1/> REGIONS: CPT | Mod: GP | Performed by: PHYSICAL THERAPIST

## 2024-06-10 ASSESSMENT — PAIN - FUNCTIONAL ASSESSMENT: PAIN_FUNCTIONAL_ASSESSMENT: 0-10

## 2024-06-10 ASSESSMENT — PAIN SCALES - GENERAL: PAINLEVEL_OUTOF10: 0 - NO PAIN

## 2024-06-13 ENCOUNTER — APPOINTMENT (OUTPATIENT)
Dept: PHARMACY | Facility: HOSPITAL | Age: 44
End: 2024-06-13
Payer: COMMERCIAL

## 2024-06-13 DIAGNOSIS — E11.9 DIABETES MELLITUS TYPE 2 WITHOUT RETINOPATHY (MULTI): ICD-10-CM

## 2024-06-13 RX ORDER — METFORMIN HYDROCHLORIDE 500 MG/1
1000 TABLET, EXTENDED RELEASE ORAL
Qty: 360 TABLET | Refills: 3 | Status: SHIPPED | OUTPATIENT
Start: 2024-06-13 | End: 2025-06-13

## 2024-06-13 NOTE — PROGRESS NOTES
Clinical Pharmacy Team contacted met with Stephanie Flores regarding a consultation for diabetes management thanks to a referral from Dr. Betsy Young MD. Below is a summary of our conversation and recommendations:    Recommendations:  Stop glipizide   Continue all other DM medications as prescribed  Patient should continue to use CGM to monitor glucose  ________________________________________________________________________      No Known Allergies    Objective     There were no vitals taken for this visit.    Diabetes Pharmacotherapy:    Trulicity 1.5 mg subcutaneous once weekly  Glipizide 5 mg once daily   Metformin 1000 mg twice daily      Lab Review  Lab Results   Component Value Date    BILITOT 0.3 05/10/2024    CALCIUM 7.8 (L) 05/10/2024    CO2 24 05/10/2024     (H) 05/10/2024    CREATININE 0.55 05/10/2024    GLUCOSE 182 (H) 05/10/2024    ALKPHOS 78 05/10/2024    K 3.8 05/10/2024    PROT 6.5 05/10/2024     05/10/2024    AST 8 (L) 05/10/2024    ALT 10 05/10/2024    BUN 7 05/10/2024    ANIONGAP 12 05/10/2024    PHOS 4.0 03/06/2024    ALBUMIN 3.4 05/10/2024    GFRF >90 04/06/2022     Lab Results   Component Value Date    TRIG 216 (H) 03/06/2024    CHOL 188 03/06/2024    LDLCALC 85 03/06/2024    HDL 59.5 03/06/2024     Lab Results   Component Value Date    HGBA1C 10.1 (H) 03/06/2024    HGBA1C 10.0 (A) 04/06/2022    HGBA1C 11.1 07/26/2021     The 10-year ASCVD risk score (Sami MERCADO, et al., 2019) is: 2%    Values used to calculate the score:      Age: 43 years      Sex: Female      Is Non- : Yes      Diabetic: Yes      Tobacco smoker: No      Systolic Blood Pressure: 127 mmHg      Is BP treated: No      HDL Cholesterol: 59.5 mg/dL      Total Cholesterol: 188 mg/dL      Monitoring         Assessment/Plan     The patient reports today for a diabetes consultation. Discussed DM regimen with the patient. Reviewed CGM report and discussed it with the patient. TIR is at goal and has  improved since last visit. Based on glycemic trends recommend stopping glipizde due to improvements in control. If further glycemic control is needed can consider increasing trulicity dose in the future. Patient was agreeable to this.         PATIENT EDUCATION/GOALS    Goals  Fasting B - 130 mg/dL  Postprandial BG: less than 180 mg/dL  A1c: less than 7%    Type of encounter: [ virtual ]    Provided counseling on lifestyle modifications, medications, and self-monitoring. Patient has no additional questions at this time. Pharmacy to follow up in 6-8 weeks. Please reach out with any questions. Thank you.       Angela Sosa, PharmD    Continue all meds under the continuation of care with the referring provider and clinical pharmacy team.

## 2024-06-18 ENCOUNTER — APPOINTMENT (OUTPATIENT)
Dept: PHYSICAL THERAPY | Facility: CLINIC | Age: 44
End: 2024-06-18
Payer: COMMERCIAL

## 2024-06-21 ENCOUNTER — APPOINTMENT (OUTPATIENT)
Dept: OPHTHALMOLOGY | Facility: CLINIC | Age: 44
End: 2024-06-21
Payer: COMMERCIAL

## 2024-06-24 ENCOUNTER — APPOINTMENT (OUTPATIENT)
Dept: PHYSICAL THERAPY | Facility: CLINIC | Age: 44
End: 2024-06-24
Payer: COMMERCIAL

## 2024-07-01 ENCOUNTER — APPOINTMENT (OUTPATIENT)
Dept: PHYSICAL THERAPY | Facility: CLINIC | Age: 44
End: 2024-07-01
Payer: COMMERCIAL

## 2024-07-08 ENCOUNTER — APPOINTMENT (OUTPATIENT)
Dept: PHYSICAL THERAPY | Facility: CLINIC | Age: 44
End: 2024-07-08
Payer: COMMERCIAL

## 2024-07-15 ENCOUNTER — APPOINTMENT (OUTPATIENT)
Dept: PHYSICAL THERAPY | Facility: CLINIC | Age: 44
End: 2024-07-15
Payer: COMMERCIAL

## 2024-07-17 ENCOUNTER — TELEMEDICINE (OUTPATIENT)
Dept: PHARMACY | Facility: HOSPITAL | Age: 44
End: 2024-07-17
Payer: COMMERCIAL

## 2024-07-17 ENCOUNTER — APPOINTMENT (OUTPATIENT)
Dept: OTOLARYNGOLOGY | Facility: CLINIC | Age: 44
End: 2024-07-17
Payer: COMMERCIAL

## 2024-07-17 DIAGNOSIS — E11.9 DIABETES MELLITUS TYPE 2 WITHOUT RETINOPATHY (MULTI): ICD-10-CM

## 2024-07-17 RX ORDER — DULAGLUTIDE 3 MG/.5ML
3 INJECTION, SOLUTION SUBCUTANEOUS
Qty: 2 ML | Refills: 11 | Status: SHIPPED | OUTPATIENT
Start: 2024-07-17

## 2024-07-17 NOTE — PROGRESS NOTES
Clinical Pharmacy Team contacted met with Stephanie Flores regarding a consultation for diabetes management thanks to a referral from Dr. Betsy Young MD. Below is a summary of our conversation and recommendations:    Recommendations:  Increase trulicity to 3 mg subcutaneous once weekly  Decrease metformin xr to 500 mg once daily  Continue all other DM medications as prescribed  Patient should continue to use CGM to monitor glucose  ________________________________________________________________________      No Known Allergies    Objective     There were no vitals taken for this visit.    Diabetes Pharmacotherapy:    Trulicity 1.5 mg subcutaneous once weekly  Glipizide 5 mg once daily   Metformin 1000 mg twice daily      Lab Review  Lab Results   Component Value Date    BILITOT 0.3 05/10/2024    CALCIUM 7.8 (L) 05/10/2024    CO2 24 05/10/2024     (H) 05/10/2024    CREATININE 0.55 05/10/2024    GLUCOSE 182 (H) 05/10/2024    ALKPHOS 78 05/10/2024    K 3.8 05/10/2024    PROT 6.5 05/10/2024     05/10/2024    AST 8 (L) 05/10/2024    ALT 10 05/10/2024    BUN 7 05/10/2024    ANIONGAP 12 05/10/2024    PHOS 4.0 03/06/2024    ALBUMIN 3.4 05/10/2024    GFRF >90 04/06/2022     Lab Results   Component Value Date    TRIG 216 (H) 03/06/2024    CHOL 188 03/06/2024    LDLCALC 85 03/06/2024    HDL 59.5 03/06/2024     Lab Results   Component Value Date    HGBA1C 10.1 (H) 03/06/2024    HGBA1C 10.0 (A) 04/06/2022    HGBA1C 11.1 07/26/2021     The 10-year ASCVD risk score (Sami MERCADO, et al., 2019) is: 2.3%    Values used to calculate the score:      Age: 44 years      Sex: Female      Is Non- : Yes      Diabetic: Yes      Tobacco smoker: No      Systolic Blood Pressure: 127 mmHg      Is BP treated: No      HDL Cholesterol: 59.5 mg/dL      Total Cholesterol: 188 mg/dL      Monitoring         Assessment/Plan     The patient reports today for a diabetes consultation. Discussed DM regimen with the  patient. Reviewed CGM report and discussed it with the patient. TIR is at goal. Patient reports experiencing diarrhea with metformin. Discussed lowering dose to 1 tablet once daily. Furthermore recommend increasing trulicity to 3 mg subcutaneous once weekly which she was agreeable. Answered all patient questions.     PATIENT EDUCATION/GOALS    Goals  Fasting B - 130 mg/dL  Postprandial BG: less than 180 mg/dL  A1c: less than 7%    Type of encounter: [ virtual ]    Provided counseling on lifestyle modifications, medications, and self-monitoring. Patient has no additional questions at this time. Pharmacy to follow up in 6-8 weeks. Please reach out with any questions. Thank you.       Angela Sosa, PharmD    Continue all meds under the continuation of care with the referring provider and clinical pharmacy team.

## 2024-07-22 ENCOUNTER — APPOINTMENT (OUTPATIENT)
Dept: PHYSICAL THERAPY | Facility: CLINIC | Age: 44
End: 2024-07-22
Payer: COMMERCIAL

## 2024-07-29 ENCOUNTER — APPOINTMENT (OUTPATIENT)
Dept: PHYSICAL THERAPY | Facility: CLINIC | Age: 44
End: 2024-07-29
Payer: COMMERCIAL

## 2024-07-31 ENCOUNTER — TELEPHONE (OUTPATIENT)
Dept: PRIMARY CARE | Facility: HOSPITAL | Age: 44
End: 2024-07-31
Payer: COMMERCIAL

## 2024-07-31 DIAGNOSIS — E03.8 OTHER SPECIFIED HYPOTHYROIDISM: ICD-10-CM

## 2024-07-31 RX ORDER — LEVOTHYROXINE SODIUM 50 UG/1
TABLET ORAL
Qty: 30 TABLET | Refills: 3 | Status: SHIPPED | OUTPATIENT
Start: 2024-07-31

## 2024-07-31 RX ORDER — LEVOTHYROXINE SODIUM 200 UG/1
TABLET ORAL
Qty: 30 TABLET | Refills: 3 | Status: SHIPPED | OUTPATIENT
Start: 2024-07-31

## 2024-08-05 ENCOUNTER — APPOINTMENT (OUTPATIENT)
Dept: PHYSICAL THERAPY | Facility: CLINIC | Age: 44
End: 2024-08-05
Payer: COMMERCIAL

## 2024-08-12 ENCOUNTER — APPOINTMENT (OUTPATIENT)
Dept: PHYSICAL THERAPY | Facility: CLINIC | Age: 44
End: 2024-08-12
Payer: COMMERCIAL

## 2024-08-19 ENCOUNTER — APPOINTMENT (OUTPATIENT)
Dept: PODIATRY | Facility: CLINIC | Age: 44
End: 2024-08-19
Payer: COMMERCIAL

## 2024-08-19 DIAGNOSIS — E11.9 ENCOUNTER FOR DIABETIC FOOT EXAM (MULTI): Primary | ICD-10-CM

## 2024-08-19 DIAGNOSIS — E11.9 DIABETES MELLITUS TYPE 2 WITHOUT RETINOPATHY (MULTI): ICD-10-CM

## 2024-08-19 PROCEDURE — 3061F NEG MICROALBUMINURIA REV: CPT | Performed by: PODIATRIST

## 2024-08-19 PROCEDURE — 99202 OFFICE O/P NEW SF 15 MIN: CPT | Performed by: PODIATRIST

## 2024-08-19 PROCEDURE — 3048F LDL-C <100 MG/DL: CPT | Performed by: PODIATRIST

## 2024-08-19 PROCEDURE — 3046F HEMOGLOBIN A1C LEVEL >9.0%: CPT | Performed by: PODIATRIST

## 2024-08-19 PROCEDURE — 1036F TOBACCO NON-USER: CPT | Performed by: PODIATRIST

## 2024-08-19 NOTE — PROGRESS NOTES
This is a 44 y.o. female new patient for Dm foot exam    History of Present Illness:   Patient states they are here for Dm exam  Denies NTB to feet  Most recent A1C is 10.1 (in Isaias 2024)  No pedal concerns noted    Past Medical History  Past Medical History:   Diagnosis Date    Encounter for gynecological examination (general) (routine) without abnormal findings 05/20/2022    Well woman exam    Personal history of malignant neoplasm of breast 04/20/2016    Personal history of breast cancer    Personal history of other endocrine, nutritional and metabolic disease     History of thyroid disease    Personal history of other endocrine, nutritional and metabolic disease 05/17/2016    History of diabetes mellitus       Medications and Allergies have been reviewed.    Review Of Systems:  GENERAL: No weight loss, malaise or fevers.  HEENT: Negative for frequent or significant headaches,   RESPIRATORY: Negative for cough, wheezing or shortness of breath.  CARDIOVASCULAR: Negative for chest pain, leg swelling or palpitations.    Physical Exam:  Patient is a pleasant, cooperative, well developed 44 y.o.  adult female. The patient is alert and oriented to time, place and person.   Patient has normal affect and mood.    Examination of Both Lower Extremities:   Objective:   Vasc: DP and PT pulses are palpable bilateral.  CFT is less than 3 seconds bilateral.  Skin temperature is warm to cool proximal to distal bilateral.      Neuro: Vibratory, light touch and proprioception are intact bilateral.  Protective sensation is intact to the foot when tested with the 5.07 SWM bilateral.  Achilles reflex is 2/4 bilateral.  There is no clonus noted.  The hallux is downgoing bilateral.      Derm: Nails 1-5 bilateral are intact.  Skin is supple with normal texture and turgor noted.  Webspaces are clean, dry and intact bilateral.  There are no hyperkeratoses, ulcerations, verruca or other lesions noted.      Ortho: Muscle strength is 5/5 for  all pedal groups tested.  Ankle joint, subtalar joint, 1st MPJ and lesser MPJ ROM is full and without pain or crepitus.  The foot type is rectus bilateral off weight bearing.  There are no structural deformities noted.      1. Encounter for diabetic foot exam (Multi)        2. Diabetes mellitus type 2 without retinopathy (Multi)  Referral to Podiatry          Patient exam and eval  A1C revd. States sugars have since been decreasing  Minimize walking barefoot  Low pedal risk notedat this time  Fu 12 mos  Sooner if any new prob arise.   Pt in agreement to plan, all questions answered    Opal Abreu DPM  [phone]  [facility]  Fax: [phone]

## 2024-09-18 ENCOUNTER — APPOINTMENT (OUTPATIENT)
Dept: PHARMACY | Facility: HOSPITAL | Age: 44
End: 2024-09-18
Payer: COMMERCIAL

## 2024-09-18 DIAGNOSIS — E11.9 DIABETES MELLITUS TYPE 2 WITHOUT RETINOPATHY (MULTI): ICD-10-CM

## 2024-09-19 RX ORDER — DULAGLUTIDE 3 MG/.5ML
3 INJECTION, SOLUTION SUBCUTANEOUS
Qty: 6 ML | Refills: 3 | Status: SHIPPED | OUTPATIENT
Start: 2024-09-19

## 2024-09-19 NOTE — PROGRESS NOTES
Clinical Pharmacy Team contacted met with Stephanie Flores regarding a consultation for diabetes management thanks to a referral from Dr. Betsy Young MD. Below is a summary of our conversation and recommendations:    Recommendations:  Continue DM medications as prescribed  Patient should continue to use CGM to monitor glucose  ________________________________________________________________________      No Known Allergies    Objective     There were no vitals taken for this visit.    Diabetes Pharmacotherapy:    Trulicity 3 mg subcutaneous once weekly  Metformin 500 mg twice daily      Lab Review  Lab Results   Component Value Date    BILITOT 0.3 05/10/2024    CALCIUM 7.8 (L) 05/10/2024    CO2 24 05/10/2024     (H) 05/10/2024    CREATININE 0.55 05/10/2024    GLUCOSE 182 (H) 05/10/2024    ALKPHOS 78 05/10/2024    K 3.8 05/10/2024    PROT 6.5 05/10/2024     05/10/2024    AST 8 (L) 05/10/2024    ALT 10 05/10/2024    BUN 7 05/10/2024    ANIONGAP 12 05/10/2024    PHOS 4.0 03/06/2024    ALBUMIN 3.4 05/10/2024    GFRF >90 04/06/2022     Lab Results   Component Value Date    TRIG 216 (H) 03/06/2024    CHOL 188 03/06/2024    LDLCALC 85 03/06/2024    HDL 59.5 03/06/2024     Lab Results   Component Value Date    HGBA1C 10.1 (H) 03/06/2024    HGBA1C 10.0 (A) 04/06/2022    HGBA1C 11.1 07/26/2021     The 10-year ASCVD risk score (Sami MERCADO, et al., 2019) is: 2.3%    Values used to calculate the score:      Age: 44 years      Sex: Female      Is Non- : Yes      Diabetic: Yes      Tobacco smoker: No      Systolic Blood Pressure: 127 mmHg      Is BP treated: No      HDL Cholesterol: 59.5 mg/dL      Total Cholesterol: 188 mg/dL      Monitoring         Assessment/Plan     The patient reports today for a diabetes consultation. Discussed DM regimen with the patient. Reviewed CGM report and discussed it with the patient. TIR is at goal. Patient reports tolerating her current regimen well.  Recommend  no changes to current DM regimen. Recommend getting repeat A1c and other yearly labs. Patient was agreeable to this. Answered all patient questions.     PATIENT EDUCATION/GOALS    Goals  Fasting B - 130 mg/dL  Postprandial BG: less than 180 mg/dL  A1c: less than 7%    Type of encounter: [ virtual ]    Provided counseling on lifestyle modifications, medications, and self-monitoring. Patient has no additional questions at this time. Pharmacy to follow up as requested. Please reach out with any questions. Thank you.       Angela Sosa, PharmD    Continue all meds under the continuation of care with the referring provider and clinical pharmacy team.

## 2024-09-23 ENCOUNTER — APPOINTMENT (OUTPATIENT)
Dept: ENDOCRINOLOGY | Facility: HOSPITAL | Age: 44
End: 2024-09-23
Payer: COMMERCIAL

## 2024-10-03 ENCOUNTER — APPOINTMENT (OUTPATIENT)
Dept: OTOLARYNGOLOGY | Facility: CLINIC | Age: 44
End: 2024-10-03
Payer: COMMERCIAL

## 2024-10-04 DIAGNOSIS — E03.8 OTHER SPECIFIED HYPOTHYROIDISM: ICD-10-CM

## 2024-11-18 ENCOUNTER — APPOINTMENT (OUTPATIENT)
Dept: ENDOCRINOLOGY | Facility: CLINIC | Age: 44
End: 2024-11-18
Payer: COMMERCIAL

## 2024-11-18 VITALS
DIASTOLIC BLOOD PRESSURE: 88 MMHG | WEIGHT: 190 LBS | HEART RATE: 91 BPM | SYSTOLIC BLOOD PRESSURE: 128 MMHG | HEIGHT: 66 IN | BODY MASS INDEX: 30.53 KG/M2

## 2024-11-18 DIAGNOSIS — E06.3 HYPOTHYROIDISM DUE TO HASHIMOTO'S THYROIDITIS: Primary | ICD-10-CM

## 2024-11-18 DIAGNOSIS — E11.9 TYPE 2 DIABETES MELLITUS WITHOUT COMPLICATION, WITHOUT LONG-TERM CURRENT USE OF INSULIN (MULTI): ICD-10-CM

## 2024-11-18 DIAGNOSIS — C73 PAPILLARY THYROID CARCINOMA (MULTI): ICD-10-CM

## 2024-11-18 DIAGNOSIS — E83.51 HYPOCALCEMIA: ICD-10-CM

## 2024-11-18 LAB — POC HEMOGLOBIN A1C: 6.4 % (ref 4.2–6.5)

## 2024-11-18 PROCEDURE — 3008F BODY MASS INDEX DOCD: CPT | Performed by: STUDENT IN AN ORGANIZED HEALTH CARE EDUCATION/TRAINING PROGRAM

## 2024-11-18 PROCEDURE — 83036 HEMOGLOBIN GLYCOSYLATED A1C: CPT | Performed by: STUDENT IN AN ORGANIZED HEALTH CARE EDUCATION/TRAINING PROGRAM

## 2024-11-18 PROCEDURE — 99215 OFFICE O/P EST HI 40 MIN: CPT | Performed by: STUDENT IN AN ORGANIZED HEALTH CARE EDUCATION/TRAINING PROGRAM

## 2024-11-18 PROCEDURE — 3061F NEG MICROALBUMINURIA REV: CPT | Performed by: STUDENT IN AN ORGANIZED HEALTH CARE EDUCATION/TRAINING PROGRAM

## 2024-11-18 PROCEDURE — 3079F DIAST BP 80-89 MM HG: CPT | Performed by: STUDENT IN AN ORGANIZED HEALTH CARE EDUCATION/TRAINING PROGRAM

## 2024-11-18 PROCEDURE — 3074F SYST BP LT 130 MM HG: CPT | Performed by: STUDENT IN AN ORGANIZED HEALTH CARE EDUCATION/TRAINING PROGRAM

## 2024-11-18 PROCEDURE — 3046F HEMOGLOBIN A1C LEVEL >9.0%: CPT | Performed by: STUDENT IN AN ORGANIZED HEALTH CARE EDUCATION/TRAINING PROGRAM

## 2024-11-18 PROCEDURE — 3048F LDL-C <100 MG/DL: CPT | Performed by: STUDENT IN AN ORGANIZED HEALTH CARE EDUCATION/TRAINING PROGRAM

## 2024-11-18 ASSESSMENT — PAIN SCALES - GENERAL: PAINLEVEL_OUTOF10: 0-NO PAIN

## 2024-11-18 NOTE — PROGRESS NOTES
Patient coming in for follow up for T2DM    Subjective   Stephanie Flores is a 44 y.o. female who presents for follow up for Type 2 diabetes mellitus.     Lab Results   Component Value Date    HGBA1C 10.1 (H) 03/06/2024    Stephanie Flores is a 42yo F with PMH of T2DM, anemia, breast cancer, vitamin D deficiency, DLD, PTC s/p total thyroidectomy, and obesity who presents to establish care for T2DM and PTC s/p total thyroidectomy.      Pertinent T2DM history:  -Diagnosed with T2DM: 2011. Had gotten surgery for breast cancer and 3 months afterwards, started to have vomiting and weight loss. Went to the ER and BG was 500s. Was admitted to the ICU and started on insulin gtt. Discharged on insulin. Was on insulin for 1 year and then transitioned to oral medications.   Last A1c today is 6.6%  Interval hx:  In July spoke to pharmacist: and they increased trulicity to 3 mg subcutaneous once weekly and Decreased metformin xr to 500 mg once daily     Current diabetes regimen:  -Trulicity 3 mg once weekly  Metformin 500 mg once daily    Has been checking BG using glucometer instead of CGM because it was inaccurate  Highest  after eating  In am: 98       Previous medications:  -Metformin alone  -Alogliptin (can't remember why it was stopped)  -Insulin (MDI--4x/day injections) for about 1 year after diagnosis     Diabetes screening  -Retinopathy: Recent in 2024.  central retinal vein occlusion (CRVO) no cystoid macular edema (CME) right eye   -Nephropathy: No ESRD. UACR negative March 2024  -Neuropathy: Has some numbness/tingling in legs follows with podiatry  -CV disease: No prior CAD. Last LDL 85  2024. Not on statin     Pertinent thyroid hx:   -Diagnosed in 2015 s/p total thyroidectomy 5/11/15. Path showing 2.2 cm focus of follicular variant PTC, no invasion, and additional 1 mm focus. 1 parathyroid gland removed as well, mild post-op hypocalcemia  -Reportedly no remnant ablation done due to social situation but no evidence  of persistent disease   -Thyrogen stimulated Tg in 10/2015 was 1.87 with a TSH of 23, unable to do body scans.   Tg down to 0.6 with TSH 1.6 in 3/2016  -Thyrogen testing done 2016. TSH highest 21, Tg 2.8. No scanning done.  -Previously followed with Dr. Louie Knowles in endocrinology, last seen 21; sporadic follow-up  -Currently on levothyroxine 250mcg daily, has been on this for about 3-4 years  -Last TSH 3.88 (22)  -Last TG 1.0 (21) with TSH 2.67, FT4 1.91  TSH: 35 back in March with T  Levothyroxine 250 mcg daily on its own    Had an US 2024 showed  Postsurgical changes status post total thyroidectomy with concern for remnant thyroid tissue in the right thyroid surgical bed. Incompletely characterized soft tissue mass anterior to the hyoid bone. Further evaluation with contrast CT of the neck can be performed.  A CT scan was done showing Status post thyroidectomy. Nearly nondiagnostic examination of the thyroidectomy bed secondary to motion degradation. Within limitation, no abnormally enhancing lesion is identified within the postsurgical thyroid bed. Consider repeat imaging when patient is amenable.  0.8 cm midline hyperdense structure just inferior to the anterior margin of the hyoid bone, nonspecific but may represent ectopic thyroid tissue.    Today:  - Vertigo improved   -Recent illnesses or steroid exposure:  Denies       Latest Reference Range & Units Most Recent   Hemoglobin A1C see below % 10.1 (H)  3/6/24 13:15   Parathyroid Hormone, Intact 18.5 - 88.0 pg/mL 28.4  3/6/24 13:15   Thyroxine, Free 0.78 - 1.48 ng/dL 1.66 (H)  24 12:31   Thyroid Stimulating Hormone 0.44 - 3.98 mIU/L 0.19 (L)  24 12:31   Vitamin D, 25-Hydroxy, Total 30 - 100 ng/mL 26 (L)  3/6/24 13:15   GLUCOSE 74 - 99 mg/dL 182 (H)  5/10/24 11:18   Estimated Average Glucose Not Established mg/dL 243  3/6/24 13:15   Thyroglobulin 1.3 - 31.8 ng/mL 0.4 (L)  24 12:31   Thyroglobulin LC-MS/MS 1.3 - 31.8 ng/mL  "Not Applicable  5/7/24 12:31   (H): Data is abnormally high  (L): Data is abnormally low     Diet:  -Breakfast: Missing it  -Lunch: [place] chicken air fried  -Dinner: Baked or air fried chicken, rice, and veggie   -Snack: Occasionally,  -Beverages: Diet POP , water     Energy: Good. Higher than before   Sleep: Sleeps okay  Cold intolerance especially at night  Sometimes night sweats  Hot flashes   Irregular periods   Weight stable  Palpitations daily from anxiety and sometimes on its own  No tremors  Itchy hands  Having diarrhea right before bed takes metformin around noon     -Numbness/tingling: In her b/l feet  -Dysphagia: No  No compressive symptoms    10 point ROS neg unless stated above     PMH: as above    Family Hx:  -Mother: pancreatic and ovarian cancers  -Grandmother: breast cancer and lung cancer  -Great grandmother: stomach ca    Review of Systems  all pertinent systems reviewed and are otherwise negative   Objective   Visit Vitals  /88 (BP Location: Right arm, Patient Position: Sitting, BP Cuff Size: Large adult)   Pulse 91   Ht 1.664 m (5' 5.5\")   Wt 86.2 kg (190 lb)   BMI 31.14 kg/m²   Smoking Status Former   BSA 2 m²      Physical Exam  Constitutional:       General: She is not in acute distress.     Appearance: Normal appearance.   Eyes:      Extraocular Movements: Extraocular movements intact.      Pupils: Pupils are equal, round, and reactive to light.   Neck:      Comments: Absent thyroid  No palpable LN  Cardiovascular:      Rate and Rhythm: Normal rate and regular rhythm.   Pulmonary:      Effort: Pulmonary effort is normal. No respiratory distress.      Breath sounds: Normal breath sounds.   Abdominal:      General: Bowel sounds are normal.      Palpations: Abdomen is soft.      Tenderness: There is no abdominal tenderness.   Skin:     Coloration: Skin is not jaundiced or pale.      Findings: No erythema or rash.   Neurological:      General: No focal deficit present.      Mental " "Status: She is alert and oriented to person, place, and time.      Deep Tendon Reflexes: Reflexes normal.   Psychiatric:         Mood and Affect: Mood normal.         Behavior: Behavior normal.         Lab Review  Glucose (mg/dL)   Date Value   05/10/2024 182 (H)   03/06/2024 294 (H)   04/06/2022 185 (H)   07/26/2021 414 (H)   01/23/2020 272 (H)     Hemoglobin A1C (%)   Date Value   03/06/2024 10.1 (H)   04/06/2022 10.0 (A)   07/26/2021 11.1   01/23/2020 9.8     Bicarbonate (mmol/L)   Date Value   05/10/2024 24   03/06/2024 24   04/06/2022 26   07/26/2021 22   01/23/2020 22     Urea Nitrogen (mg/dL)   Date Value   05/10/2024 7   03/06/2024 9   04/06/2022 7   07/26/2021 7   01/23/2020 11     Creatinine (mg/dL)   Date Value   05/10/2024 0.55   03/06/2024 0.78   04/06/2022 0.71   07/26/2021 0.82   01/23/2020 0.81     Lab Results   Component Value Date    CHOL 188 03/06/2024    CHOL 198 06/27/2022     Lab Results   Component Value Date    HDL 59.5 03/06/2024    HDL 54.3 06/27/2022     Lab Results   Component Value Date    LDLCALC 85 03/06/2024     Lab Results   Component Value Date    TRIG 216 (H) 03/06/2024    TRIG 226 (H) 06/27/2022     No components found for: \"CHOLHDL\"   Lab Results   Component Value Date    TSH 0.19 (L) 05/07/2024       Assessment/Plan    Stephanie Flores is a 44yo F with PMH of T2DM, anemia, breast cancer, vitamin D deficiency, DLD, PTC s/p total thyroidectomy, and obesity who presents to establish care for T2DM and PTC s/p total thyroidectomy.      Pertinent T2DM history:  -Diagnosed with T2DM: 2011. Had gotten surgery for breast cancer and 3 months afterwards, started to have vomiting and weight loss. Went to the ER and BG was 500s. Was admitted to the ICU and started on insulin gtt. Discharged on insulin. Was on insulin for 1 year and then transitioned to oral medications.   Last A1c today is 6.6%  In July spoke to pharmacist: and they increased trulicity to 3 mg subcutaneous once weekly and " Decreased metformin xr to 500 mg once daily     Current diabetes regimen:  -Trulicity 3 mg once weekly  Metformin 500 mg once daily    Has been checking BG using glucometer instead of CGM because it was inaccurate  Highest  after eating  In am: 98  Previous medications:  -Metformin alone  -Alogliptin (can't remember why it was stopped)  -Insulin (MDI--4x/day injections) for about 1 year after diagnosis     Diabetes screening  -Retinopathy: Recent in .  central retinal vein occlusion (CRVO) no cystoid macular edema (CME) right eye   -Nephropathy: No ESRD. UACR negative 2024  -Neuropathy: Has some numbness/tingling in legs follows with podiatry  -CV disease: No prior CAD. Last LDL 2024. Not on statin   Plan:  Continue Metformin try to take after lunch  Continue trulicity 3 mg weekly  Continue to watch diet  Follow with ophthalmology  Cholesterol blood work fasting      # PTC  -Diagnosed in  s/p total thyroidectomy 5/11/15. Path showing 2.2 cm focus of follicular variant PTC, no invasion, and additional 1 mm focus. 1 parathyroid gland removed as well, mild post-op hypocalcemia  -Reportedly no remnant ablation done due to social situation but no evidence of persistent disease   -Thyrogen stimulated Tg in 10/2015 was 1.87 with a TSH of 23, unable to do body scans.   Tg down to 0.6 with TSH 1.6 in 3/2016  -Thyrogen testing done 2016. TSH highest 21, Tg 2.8. No scanning done.  -Previously followed with Dr. Louie Knowles in endocrinology, last seen 21; sporadic follow-up  -Currently on levothyroxine 250mcg daily, has been on this for about 3-4 years  -Last TSH 3.88 (22)  -Last TG 1.0 (21) with TSH 2.67, FT4 1.91  TSH: 35 back in March with T  Levothyroxine 250 mcg daily on its own    Had an US 2024 showed  Postsurgical changes status post total thyroidectomy with concern for remnant thyroid tissue in the right thyroid surgical bed. Incompletely characterized soft tissue mass  anterior to the hyoid bone. Further evaluation with contrast CT of the neck can be performed.  A CT scan was done showing Status post thyroidectomy. Nearly nondiagnostic examination of the thyroidectomy bed secondary to motion degradation. Within limitation, no abnormally enhancing lesion is identified within the postsurgical thyroid bed. Consider repeat imaging when patient is amenable.  0.8 cm midline hyperdense structure just inferior to the anterior margin of the hyoid bone, nonspecific but may represent ectopic thyroid tissue.    Plan:  Continue levothyroxine 250 mcg daily  to be taken on an empty stomach on its own 30min before other meds or food  Blood work  Thyrogen stimulated WBS to evaluate if there's any remnant thyroid    RTC in 3 months      Assessment & Plan  Type 2 diabetes mellitus without complication, without long-term current use of insulin (Multi)    Orders:    POCT glycosylated hemoglobin (Hb A1C) manually resulted    Thyroid Stimulating Hormone; Future    Thyroxine, Free; Future    Renal Function Panel; Future    Lipid Panel; Future    Thyroglobulin + Antithyroglobulin; Future    Hypothyroidism due to Hashimoto's thyroiditis    Orders:    Thyroid Stimulating Hormone; Future    Thyroxine, Free; Future    Renal Function Panel; Future    Lipid Panel; Future    Thyroglobulin + Antithyroglobulin; Future    NM I-123 Thyroid 24 hour scan; Future    Papillary thyroid carcinoma (Multi)    Orders:    Thyroglobulin + Antithyroglobulin; Future    NM I-123 Thyroid 24 hour scan; Future      At least 42 minutes of direct consultation was spent reviewing the patient's chart as well as discussing and  reviewing the plan including educating and answering questions and concerns, greater than 50 percent spent in counseling and coordination of care.

## 2024-11-18 NOTE — PATIENT INSTRUCTIONS
Continue Metformin try to take after lunch  Continue trulicity 3 mg weekly  Continue to watch diet  Follow with ophthalmology  Cholesterol blood work fasting    Continue levothyroxine 250 mcg daily  to be taken on an empty stomach on its own 30min before other meds or food  Blood work  Thyrogen stimulated WBS to evaluate if there's any remnant thyroid    RTC in 3 months

## 2024-11-21 ENCOUNTER — HOSPITAL ENCOUNTER (OUTPATIENT)
Dept: RADIOLOGY | Facility: HOSPITAL | Age: 44
Discharge: HOME | End: 2024-11-21
Payer: COMMERCIAL

## 2024-11-21 ENCOUNTER — LAB (OUTPATIENT)
Dept: LAB | Facility: LAB | Age: 44
End: 2024-11-21
Payer: COMMERCIAL

## 2024-11-21 VITALS — HEIGHT: 65 IN | BODY MASS INDEX: 31.66 KG/M2 | WEIGHT: 190.04 LBS

## 2024-11-21 DIAGNOSIS — C73 PAPILLARY THYROID CARCINOMA (MULTI): ICD-10-CM

## 2024-11-21 DIAGNOSIS — Z12.31 SCREENING MAMMOGRAM FOR BREAST CANCER: ICD-10-CM

## 2024-11-21 DIAGNOSIS — E11.9 TYPE 2 DIABETES MELLITUS WITHOUT COMPLICATION, WITHOUT LONG-TERM CURRENT USE OF INSULIN (MULTI): ICD-10-CM

## 2024-11-21 DIAGNOSIS — E06.3 HYPOTHYROIDISM DUE TO HASHIMOTO'S THYROIDITIS: ICD-10-CM

## 2024-11-21 LAB
ALBUMIN SERPL BCP-MCNC: 4.3 G/DL (ref 3.4–5)
ANION GAP SERPL CALC-SCNC: 12 MMOL/L (ref 10–20)
BUN SERPL-MCNC: 8 MG/DL (ref 6–23)
CALCIUM SERPL-MCNC: 8.5 MG/DL (ref 8.6–10.6)
CHLORIDE SERPL-SCNC: 106 MMOL/L (ref 98–107)
CHOLEST SERPL-MCNC: 162 MG/DL (ref 0–199)
CHOLESTEROL/HDL RATIO: 3
CO2 SERPL-SCNC: 25 MMOL/L (ref 21–32)
CREAT SERPL-MCNC: 0.76 MG/DL (ref 0.5–1.05)
EGFRCR SERPLBLD CKD-EPI 2021: >90 ML/MIN/1.73M*2
GLUCOSE SERPL-MCNC: 103 MG/DL (ref 74–99)
HDLC SERPL-MCNC: 53.4 MG/DL
LDLC SERPL CALC-MCNC: 90 MG/DL
NON HDL CHOLESTEROL: 109 MG/DL (ref 0–149)
PHOSPHATE SERPL-MCNC: 4.8 MG/DL (ref 2.5–4.9)
POTASSIUM SERPL-SCNC: 4.2 MMOL/L (ref 3.5–5.3)
SODIUM SERPL-SCNC: 139 MMOL/L (ref 136–145)
T4 FREE SERPL-MCNC: 1.25 NG/DL (ref 0.78–1.48)
TRIGL SERPL-MCNC: 95 MG/DL (ref 0–149)
TSH SERPL-ACNC: 2.09 MIU/L (ref 0.44–3.98)
VLDL: 19 MG/DL (ref 0–40)

## 2024-11-21 PROCEDURE — 80069 RENAL FUNCTION PANEL: CPT

## 2024-11-21 PROCEDURE — 77067 SCR MAMMO BI INCL CAD: CPT

## 2024-11-21 PROCEDURE — 36415 COLL VENOUS BLD VENIPUNCTURE: CPT

## 2024-11-21 PROCEDURE — 80061 LIPID PANEL: CPT

## 2024-11-21 PROCEDURE — 84439 ASSAY OF FREE THYROXINE: CPT

## 2024-11-21 PROCEDURE — 84443 ASSAY THYROID STIM HORMONE: CPT

## 2024-11-21 PROCEDURE — 86800 THYROGLOBULIN ANTIBODY: CPT | Performed by: STUDENT IN AN ORGANIZED HEALTH CARE EDUCATION/TRAINING PROGRAM

## 2024-12-04 LAB — SCAN RESULT: NORMAL

## 2024-12-17 DIAGNOSIS — C73 THYROID CANCER (MULTI): ICD-10-CM

## 2025-01-13 ENCOUNTER — APPOINTMENT (OUTPATIENT)
Dept: ENDOCRINOLOGY | Facility: CLINIC | Age: 45
End: 2025-01-13
Payer: COMMERCIAL

## 2025-01-14 ENCOUNTER — APPOINTMENT (OUTPATIENT)
Dept: ENDOCRINOLOGY | Facility: CLINIC | Age: 45
End: 2025-01-14
Payer: COMMERCIAL

## 2025-01-14 ENCOUNTER — APPOINTMENT (OUTPATIENT)
Dept: RADIOLOGY | Facility: HOSPITAL | Age: 45
End: 2025-01-14
Payer: COMMERCIAL

## 2025-01-15 ENCOUNTER — APPOINTMENT (OUTPATIENT)
Dept: RADIOLOGY | Facility: HOSPITAL | Age: 45
End: 2025-01-15
Payer: COMMERCIAL

## 2025-02-27 ENCOUNTER — APPOINTMENT (OUTPATIENT)
Dept: OTOLARYNGOLOGY | Facility: CLINIC | Age: 45
End: 2025-02-27
Payer: COMMERCIAL

## 2025-04-29 ENCOUNTER — APPOINTMENT (OUTPATIENT)
Dept: ORTHOPEDIC SURGERY | Facility: HOSPITAL | Age: 45
End: 2025-04-29
Payer: COMMERCIAL

## 2025-04-29 DIAGNOSIS — M79.641 BILATERAL HAND PAIN: ICD-10-CM

## 2025-04-29 DIAGNOSIS — M79.642 BILATERAL HAND PAIN: ICD-10-CM

## 2025-05-20 ENCOUNTER — HOSPITAL ENCOUNTER (OUTPATIENT)
Dept: RADIOLOGY | Facility: HOSPITAL | Age: 45
Discharge: HOME | End: 2025-05-20
Payer: COMMERCIAL

## 2025-05-20 ENCOUNTER — OFFICE VISIT (OUTPATIENT)
Dept: ORTHOPEDIC SURGERY | Facility: HOSPITAL | Age: 45
End: 2025-05-20
Payer: COMMERCIAL

## 2025-05-20 DIAGNOSIS — M79.641 BILATERAL HAND PAIN: ICD-10-CM

## 2025-05-20 DIAGNOSIS — G56.03 BILATERAL CARPAL TUNNEL SYNDROME: Primary | ICD-10-CM

## 2025-05-20 DIAGNOSIS — M79.642 BILATERAL HAND PAIN: ICD-10-CM

## 2025-05-20 PROCEDURE — 20550 NJX 1 TENDON SHEATH/LIGAMENT: CPT | Performed by: STUDENT IN AN ORGANIZED HEALTH CARE EDUCATION/TRAINING PROGRAM

## 2025-05-20 PROCEDURE — 73130 X-RAY EXAM OF HAND: CPT | Mod: 50

## 2025-05-20 PROCEDURE — 99204 OFFICE O/P NEW MOD 45 MIN: CPT | Performed by: STUDENT IN AN ORGANIZED HEALTH CARE EDUCATION/TRAINING PROGRAM

## 2025-05-20 PROCEDURE — 73130 X-RAY EXAM OF HAND: CPT | Mod: BILATERAL PROCEDURE | Performed by: RADIOLOGY

## 2025-05-20 PROCEDURE — 99214 OFFICE O/P EST MOD 30 MIN: CPT | Mod: 25 | Performed by: STUDENT IN AN ORGANIZED HEALTH CARE EDUCATION/TRAINING PROGRAM

## 2025-05-20 PROCEDURE — 1036F TOBACCO NON-USER: CPT | Performed by: STUDENT IN AN ORGANIZED HEALTH CARE EDUCATION/TRAINING PROGRAM

## 2025-05-20 ASSESSMENT — PAIN DESCRIPTION - DESCRIPTORS
DESCRIPTORS: THROBBING
DESCRIPTORS: THROBBING;SHARP;SHOOTING

## 2025-05-20 ASSESSMENT — PAIN - FUNCTIONAL ASSESSMENT: PAIN_FUNCTIONAL_ASSESSMENT: 0-10

## 2025-05-20 ASSESSMENT — PAIN SCALES - GENERAL
PAINLEVEL_OUTOF10: 5 - MODERATE PAIN
PAINLEVEL_OUTOF10: 5 - MODERATE PAIN

## 2025-05-20 NOTE — PROGRESS NOTES
CHIEF COMPLAINT: Right trigger thumb, concern for bilateral carpal tunnel syndrome  DOI: None  DOS: None      HISTORY OF PRESENT ILLNESS    This is a very pleasant 44-year-old female, right-hand-dominant, not currently working, lives with other adults denies active tobacco use, type 2 diabetes recent hemoglobin A1c of 6.4, denies active anticoagulation use, denies rheumatoid arthritis or gout diagnoses, denies any discrete cervical spine issues, denies prior surgeries to upper extremities, denies any discrete traumas.    Denies new patient evaluation for 2 primary complaints: Right thumb pain at the volar aspect of the MCP as well as mechanical sticking clicking and locking.  She states has been going on for few months.  That is quite painful and bothersome.  She also reports many years of bilateral hand paresthesias.  This involves her thumb and small fingers although spares her middle and ring fingers.  This is daily, mostly paresthesias but also pain.  She initially tried nocturnal extension bracing although this did not help.  Positive shake sign.    PHYSICAL EXAM    Extremities / Musculoskeletal:                  Right hand:  No gross deformity no active skin lesions open wounds or erythema edema or ecchymoses.  Full composite digital flexion/extension of index or small finger.  FDS FDP index or small finger.  FPL EPL intact.  Focally tender about the A1 pulley of the thumb.  Reproducible sticking clicking no skip triggering.  Negative modified Durkan's, negative Phalen's, negative Tinel.  Negative deep elbow flexion test, no appreciable ulnar nerve stability at the cubital tunnel.  Motor intact to radian/PIN/median/AIN/Ulnar nerve distributions. Sensation intact to light touch in the median/ulnar/radial nerve distributions. 2+ radial pulse at the wrist, hand and all digits are warm and well-perfused with a brisk capillary refill of <2s.     Left Hand:  No gross deformity no active skin lesions open wounds or  erythema edema or ecchymoses.  Full composite digital flexion/extension of index or small finger.  FDS FDP index or small finger.  FPL EPL intact.    Negative modified Durkan's, negative Phalen's, negative Tinel.  Negative deep elbow flexion test, no appreciable ulnar nerve stability at the cubital tunnel.  Motor intact to radian/PIN/median/AIN/Ulnar nerve distributions. Sensation intact to light touch in the median/ulnar/radial nerve distributions. 2+ radial pulse at the wrist, hand and all digits are warm and well-perfused with a brisk capillary refill of <2s.     HgA1c:   Lab Results   Component Value Date    HGBA1C 6.4 11/18/2024    [id number] 243 03/06/2024       IMAGING / LABS / EMGs:    Bilateral hand x-ray series obtained today and independently reviewed demonstrating stable global alignment, no signs of acute fracture dislocations, well-preserved joint spaces    Medical History[1]    Medication Documentation Review Audit       Reviewed by Robyn Feldman MA (Medical Assistant) on 05/20/25 at 0942      Medication Order Taking? Sig Documenting Provider Last Dose Status   blood-glucose sensor (FreeStyle Loreta 3 Sensor) device [id number] Yes Change every 14 days Betsy Young MD Taking Active   cholecalciferol (Vitamin D-3) 50 MCG (2000 UT) tablet [id number] Yes Take one tablet daily as directed Betsy Young MD Taking Active   dulaglutide (Trulicity) 1.5 mg/0.5 mL pen injector injection [id number] Yes Inject 1.5 mg under the skin 1 (one) time per week. Betsy Young MD  Active   dulaglutide (Trulicity) 3 mg/0.5 mL pen injector [id number] Yes Inject 3 mg under the skin every 7 days. Betsy Young MD  Active   ferrous sulfate, 325 mg ferrous sulfate, tablet [id number] Yes Take 1 tablet by mouth 2 times daily (morning and late afternoon). Debora Marinelli MD  Active   FreeStyle Lite Strips strip [id number] Yes 2 times a day. Historical Provider, MD Taking Active   levothyroxine (Synthroid, Levoxyl) 200 mcg  tablet [id number] Yes TAKE 1 TABLET BY MOUTH EVERY MORNING ON AN EMPTY STOMACH AS DIRECTED Betsy Young MD  Active   levothyroxine (Synthroid, Levoxyl) 50 mcg tablet [id number] Yes TAKE 1 TABLET BY MOUTH EVERY MORNING ON AN EMPTY STOMACH AS DIRECTED Betsy Young MD  Active   melatonin 3 mg tablet [id number] Yes Take by mouth. Historical Provider, MD Taking Active   metFORMIN  mg 24 hr tablet [id number] Yes Take 2 tablets (1,000 mg) by mouth 2 times daily (morning and late afternoon). Do not crush, chew, or split. Betsy Young MD  Active                    RX Allergies[2]    Surgical History[3]      ASSESSMENT:    right trigger thumb: We discussed this diagnosis as well as available treatment options.  We discussed the possibility of an in office corticosteroid injection.  We discussed the risks of this which include transient blood glucose elevation as well as theoretical risk of infection and possible skin hypopigmentation.  Patient clearly understands the clinical scenario and elected proceed with an injection for right thumb flexor.  Procedure:  1cc consisting of equal parts 1% lidocaine without epinephrine and 40mg/mL Kenalog solutions. This was injected into the right thumb flexor sheath at the level of A1 pulley. This was done in the usual sterile fashion, the patient tolerated the procedure well.   2.  Concern for bilateral carpal tunnel syndrome: Patient had an EMG performed in 2011 with no final read and on my assessment the peak latencies and velocities were not terribly concerning for median nerve compression at the level of the wrist.  The patient's symptom distribution is atypical for isolated median nerve compression at the wrist.  For these reasons I would like to obtain an updated electrodiagnostic study of bilateral upper extremities.  Given nocturnal bracing has not helped in the past and she says worsened her symptoms and we can avoid this for now.    PLAN:   Right thumb trigger  injection today  Bilateral upper extremity EDX ordered    Follow-up in: After completion of bilateral upper extremity EDX  XR at next visit: None    The diagnosis and treatment plan were reviewed with the patient. All questions were answered. The patient verbalized understanding of the treatment plan. There were no barriers to understanding identified.     Note dictated with Features[employer] software.  Completed without full type editing and sent to avoid delay.    Chris Harper M.D.    Department of Orthopaedics  Hand/Upper Extremity  Phone: [phone]  Appt. Phone: [phone]\         [1]   Past Medical History:  Diagnosis Date    Encounter for gynecological examination (general) (routine) without abnormal findings 05/20/2022    Well woman exam    Personal history of malignant neoplasm of breast 04/20/2016    Personal history of breast cancer    Personal history of other endocrine, nutritional and metabolic disease     History of thyroid disease    Personal history of other endocrine, nutritional and metabolic disease 05/17/2016    History of diabetes mellitus   [2] No Known Allergies  [3]   Past Surgical History:  Procedure Laterality Date    BREAST LUMPECTOMY  08/07/2014    Left Breast Lumpectomy    TOTAL THYROIDECTOMY  07/01/2015    Thyroid Surgery Total Thyroidectomy

## 2025-06-10 ENCOUNTER — TELEPHONE (OUTPATIENT)
Dept: GENETICS | Facility: HOSPITAL | Age: 45
End: 2025-06-10
Payer: COMMERCIAL

## 2025-07-01 NOTE — PROGRESS NOTES
"Cancer Genetic Counseling - Initial Evaluation    Patient name:  Stephanie Flores  :   1980  MRN:  [id number]     Referred by: Dr. Magdalena Villafana     Virtual or Telephone Consent    An interactive audio and video telecommunication system which permits real time communications between the patient (at the originating site) and provider (at the distant site) was utilized to provide this telehealth service. Verbal consent was requested and obtained from Stephanie Flores on this date, 25 for a telehealth visit and the patient's location was confirmed at the time of the visit.    HISTORY OF PRESENT ILLNESS:  Stephanie Flores is a 44 y.o. female referred for a personal history of early-onset breast cancer, family history of breast and pancreatic cancers, and to discuss updated genetic testing options.      Cancer medical history:  Personal history of cancer? Yes   Type: Left breast cancer (ER+MT+, [id number] )  Age at diagnosis: 30  Summary: She was treated at [facility] with breast conserving surgery, 4 cycles of adjuvant docetaxel and cyclophosphamide chemotherapy and whole breast radiotherapy (discontinued prior to receiving a boost due to pregnancy).     History of other cancers: Yes, papillary thyroid cancer diagnosed at 33    Prior genetic testing?  She was seen by  Genetics in 2015 due to her personal and family history.  The 17-gene BreastNext panel from [facility] identified a \"variant of uncertain significance\" in BRCA2 (c.[id number]), which is still classified as a VUS.          Cancer screening history:  Mammograms: Yes, annually  Breast Biopsy: Yes, one in total as noted above  PAP test: Yes, all normal  Colonoscopy: No  Upper endoscopies: No  Dermatology: No  Other: No    Reproductive History:    Number of children: 3  Number of pregnancies: 3  Age first birth: 18  Breast feeding? Yes , for her 1 child for 6 months  Menarche (age): 11  Menopause (age): 43  OCP: No   HRT: No     Hysterectomy? No " "  Oophorectomy? No     FAMILY HISTORY:  A 4-generation pedigree was obtained, and the significant findings are noted below.  Family history (pedigree) will be scanned into Epic.  - Mother,  at 56 from pancreatic cancer.  Reportedly had ovarian cancer at 46 but did not have any surgery or treatment for this.  - Maternal grandmother,  in her 60's, stomach cancer and lung cancer (nonsmoker)  - Paternal half-sister,  at 60, breast cancer at 56  - Paternal half-sister, lung cancer, smoker     Maternal ancestry is .  Paternal ancestry is . There is no known Ashkenazi [employer]ism ancestry.  Consanguinity was denied.       COUNSELING:  Most cancers are not due to an inherited genetic susceptibility. However, about 5-10% of cancer is due to a hereditary cause. Those with a genetic predisposition to cancer are born with a gene change (pathogenic variant) that makes them more susceptible to developing certain forms of cancer. Within families with a genetic predisposition to cancer, we often see \"red flags\", such as cancers diagnosed at earlier ages than typical, multiple family members with the same cancer, multiple primary cancers in the same individual, and cancer occurring in multiple generations. Seeing a certain combination of cancers within the same family may also raise suspicion for an underlying genetic predisposition.     Stephanie was counseled about hereditary cancer susceptibility including cancer risks and genetic testing. The option of genetic testing via a hereditary cancer panel was presented. We specifically discussed the 77-gene CancerNext-Expanded +Accuradionsight Panel through [facility]. This test analyzes a number of genes associated with hereditary cancer. Each gene on the panel is associated with an increased risk for certain types of cancer, and some genes increase the risk for cancer to a greater extent than others.  Stephanie's initial genetic testing in " 2015 only included 17 genes and did not identify a genetic cause for her personal history or the family history of cancer.  Therefore, updated testing was recommended today.      Stephanie was informed of the possible results of genetic testing (positive, negative, and variant(s) of uncertain significance).  The purpose of testing and potential implications for Stephanie and her family were discussed.      IMPRESSION:  Stephanie meets NCCN criteria for genetic testing for hereditary high-penetrance breast cancer susceptibility genes  based on the personal history of early-onset breast cancer (diagnosed at 30).     -Genetic testing ordered: CancerNext-Expanded panel +RNAinsight through [facility], which analyzes the following 77 genes: AIP, ALK, APC, JUAN JOSE, AXIN2, BAP1, BARD1, BMPR1A, BRCA1, BRCA2, BRIP1, CDC73, CDH1, CDK4, CDKN1B, CDKN2A, CEBPA, CHEK2, CTNNA1, DDX41, DICER1, EGFR, EPCAM, ETV6, FH, FLCN, GATA2, GREM1, HOXB13, KIT, LZTR1, MAX, MBD4, MEN1, MET, MITF, MLH1, MSH2, MSH3, MSH6, MUTYH, NF1, NF2, NTHL1, PALB2, PDGFRA, PHOX2B, PMS2, POLD1, POLE, POT1, BTSYV3U, PTCH1, PTEN, RAD51C, RAD51D, RB1, RET, RPS20, RUNX1, SDHA, SDHAF2, SDHB, SDHC, SDHD, SMAD4, SMARCA4, SMARCB1, SMARCE1, STK11, SUFU, VXML992, TP53, TSC1, TSC2, VHL, WT1.    -Screening recommendations: If the results are positive for a pathogenic (disease-causing) variant, then we would discuss relevant options for cancer risk management (such as extra cancer screenings or risk-reducing options) according to current practice guidelines. If genetic testing is negative or not completed, it is recommended that Stephanie proceed with cancer screenings as per her personal medical history and family history of cancer.       PLAN:  - Stephanie elected to proceed with genetic testing via the 77-gene CancerNext-Expanded +RNAinsight Panel panel through [facility].  - Verbal consent for testing was obtained.    - [employer]'s insurance billing process was explained.   - Stephanie elected  to proceed with testing via a blood draw.  - An [employer] DNA/RNA blood test kit will be sent to the patient's home. The patient will also be sent a [facility] lab order via [employer]. The patient will then bring the test kit and lab order to a  outpatient/[facility] blood draw lab to have their blood drawn for testing (using the test tubes provided in the kit).   - The sample will be sent to [facility] for analysis.  Results are typically expected within 3-4 weeks.  - Stephanie will return for a follow up visit to discuss her test results, once available.   Further recommendations for Stephanie and her family members will be made accordingly at that time.      Please contact us with any additional questions or concerns.      Elke Bhatti MS  Licensed Genetic Counselor  [facility]  [phone]      Total time spent on day of encounter: 45 minutes (25 minutes with patient, 20 minutes on pre/post patient care activities, including documentation).

## 2025-07-02 ENCOUNTER — TELEMEDICINE CLINICAL SUPPORT (OUTPATIENT)
Dept: GENETICS | Facility: CLINIC | Age: 45
End: 2025-07-02
Payer: COMMERCIAL

## 2025-07-02 ENCOUNTER — TELEPHONE (OUTPATIENT)
Dept: GENETICS | Facility: CLINIC | Age: 45
End: 2025-07-02

## 2025-07-02 DIAGNOSIS — Z80.0 FAMILY HISTORY OF PANCREATIC CANCER: ICD-10-CM

## 2025-07-02 DIAGNOSIS — Z17.0 MALIGNANT NEOPLASM OF BREAST IN FEMALE, ESTROGEN RECEPTOR POSITIVE, UNSPECIFIED LATERALITY, UNSPECIFIED SITE OF BREAST: ICD-10-CM

## 2025-07-02 DIAGNOSIS — Z80.3 FAMILY HISTORY OF BREAST CANCER: ICD-10-CM

## 2025-07-02 DIAGNOSIS — C73 PAPILLARY CARCINOMA OF THYROID: ICD-10-CM

## 2025-07-02 DIAGNOSIS — Z71.83 ENCOUNTER FOR NONPROCREATIVE GENETIC COUNSELING: Primary | ICD-10-CM

## 2025-07-02 DIAGNOSIS — C50.919 MALIGNANT NEOPLASM OF BREAST IN FEMALE, ESTROGEN RECEPTOR POSITIVE, UNSPECIFIED LATERALITY, UNSPECIFIED SITE OF BREAST: ICD-10-CM

## 2025-07-08 ENCOUNTER — APPOINTMENT (OUTPATIENT)
Dept: NEUROLOGY | Facility: HOSPITAL | Age: 45
End: 2025-07-08
Payer: COMMERCIAL

## 2025-07-08 DIAGNOSIS — C50.919 MALIGNANT NEOPLASM OF BREAST IN FEMALE, ESTROGEN RECEPTOR POSITIVE, UNSPECIFIED LATERALITY, UNSPECIFIED SITE OF BREAST: ICD-10-CM

## 2025-07-08 DIAGNOSIS — Z17.0 MALIGNANT NEOPLASM OF BREAST IN FEMALE, ESTROGEN RECEPTOR POSITIVE, UNSPECIFIED LATERALITY, UNSPECIFIED SITE OF BREAST: ICD-10-CM

## 2025-07-08 DIAGNOSIS — Z80.0 FAMILY HISTORY OF PANCREATIC CANCER: ICD-10-CM

## 2025-07-08 DIAGNOSIS — Z80.3 FAMILY HISTORY OF BREAST CANCER: ICD-10-CM

## 2025-07-22 ENCOUNTER — TELEPHONE (OUTPATIENT)
Dept: GENETICS | Facility: CLINIC | Age: 45
End: 2025-07-22
Payer: COMMERCIAL

## 2025-07-23 NOTE — TELEPHONE ENCOUNTER
I attempted to call the patient regarding their outstanding genetic testing sample ordered by CORAZON Mohan. I was unable to leave a voicemail because the patient's voicemail box was full.

## 2025-07-28 ENCOUNTER — LAB (OUTPATIENT)
Dept: LAB | Facility: HOSPITAL | Age: 45
End: 2025-07-28
Payer: COMMERCIAL

## 2025-07-28 DIAGNOSIS — C50.919 MALIGNANT NEOPLASM OF UNSPECIFIED SITE OF UNSPECIFIED FEMALE BREAST: Primary | ICD-10-CM

## 2025-07-28 PROCEDURE — 36415 COLL VENOUS BLD VENIPUNCTURE: CPT

## 2025-07-31 DIAGNOSIS — E03.8 OTHER SPECIFIED HYPOTHYROIDISM: ICD-10-CM

## 2025-07-31 RX ORDER — LEVOTHYROXINE SODIUM 50 UG/1
TABLET ORAL
Qty: 30 TABLET | Refills: 1 | Status: SHIPPED | OUTPATIENT
Start: 2025-07-31

## 2025-07-31 RX ORDER — LEVOTHYROXINE SODIUM 200 UG/1
TABLET ORAL
Qty: 30 TABLET | Refills: 1 | Status: SHIPPED | OUTPATIENT
Start: 2025-07-31

## 2025-08-04 ENCOUNTER — APPOINTMENT (OUTPATIENT)
Dept: ENDOCRINOLOGY | Facility: CLINIC | Age: 45
End: 2025-08-04
Payer: COMMERCIAL

## 2025-08-06 ENCOUNTER — APPOINTMENT (OUTPATIENT)
Dept: GENETICS | Facility: CLINIC | Age: 45
End: 2025-08-06
Payer: COMMERCIAL

## 2025-08-06 ENCOUNTER — TELEPHONE (OUTPATIENT)
Dept: GENETICS | Facility: CLINIC | Age: 45
End: 2025-08-06
Payer: COMMERCIAL

## 2025-08-19 LAB
COMMENTS - MP RESULT TYPE: NORMAL
SCAN RESULT: NORMAL

## 2025-08-25 ENCOUNTER — TELEMEDICINE CLINICAL SUPPORT (OUTPATIENT)
Facility: CLINIC | Age: 45
End: 2025-08-25
Payer: COMMERCIAL

## 2025-08-25 ENCOUNTER — APPOINTMENT (OUTPATIENT)
Dept: ENDOCRINOLOGY | Facility: CLINIC | Age: 45
End: 2025-08-25
Payer: COMMERCIAL

## 2025-08-25 VITALS
BODY MASS INDEX: 27.48 KG/M2 | WEIGHT: 171 LBS | HEART RATE: 98 BPM | HEIGHT: 66 IN | SYSTOLIC BLOOD PRESSURE: 139 MMHG | DIASTOLIC BLOOD PRESSURE: 82 MMHG

## 2025-08-25 DIAGNOSIS — Z17.0 MALIGNANT NEOPLASM OF BREAST IN FEMALE, ESTROGEN RECEPTOR POSITIVE, UNSPECIFIED LATERALITY, UNSPECIFIED SITE OF BREAST: ICD-10-CM

## 2025-08-25 DIAGNOSIS — E11.9 TYPE 2 DIABETES MELLITUS WITHOUT COMPLICATION, WITHOUT LONG-TERM CURRENT USE OF INSULIN: ICD-10-CM

## 2025-08-25 DIAGNOSIS — E83.51 HYPOCALCEMIA: ICD-10-CM

## 2025-08-25 DIAGNOSIS — F32.A DEPRESSION, UNSPECIFIED DEPRESSION TYPE: ICD-10-CM

## 2025-08-25 DIAGNOSIS — E06.3 HYPOTHYROIDISM DUE TO HASHIMOTO'S THYROIDITIS: Primary | ICD-10-CM

## 2025-08-25 DIAGNOSIS — Z80.0 FAMILY HISTORY OF PANCREATIC CANCER: ICD-10-CM

## 2025-08-25 DIAGNOSIS — C50.919 MALIGNANT NEOPLASM OF BREAST IN FEMALE, ESTROGEN RECEPTOR POSITIVE, UNSPECIFIED LATERALITY, UNSPECIFIED SITE OF BREAST: ICD-10-CM

## 2025-08-25 DIAGNOSIS — Z71.83 ENCOUNTER FOR NONPROCREATIVE GENETIC COUNSELING: Primary | ICD-10-CM

## 2025-08-25 DIAGNOSIS — C73 PAPILLARY CARCINOMA OF THYROID: ICD-10-CM

## 2025-08-25 DIAGNOSIS — Z80.3 FAMILY HISTORY OF BREAST CANCER: ICD-10-CM

## 2025-08-25 DIAGNOSIS — C73 PAPILLARY THYROID CARCINOMA: ICD-10-CM

## 2025-08-25 LAB
POC FINGERSTICK BLOOD GLUCOSE: 247 MG/DL (ref 70–100)
POC HEMOGLOBIN A1C: 11.7 % (ref 4.2–6.5)

## 2025-08-25 PROCEDURE — 3079F DIAST BP 80-89 MM HG: CPT | Performed by: STUDENT IN AN ORGANIZED HEALTH CARE EDUCATION/TRAINING PROGRAM

## 2025-08-25 PROCEDURE — 82962 GLUCOSE BLOOD TEST: CPT | Performed by: STUDENT IN AN ORGANIZED HEALTH CARE EDUCATION/TRAINING PROGRAM

## 2025-08-25 PROCEDURE — 3008F BODY MASS INDEX DOCD: CPT | Performed by: STUDENT IN AN ORGANIZED HEALTH CARE EDUCATION/TRAINING PROGRAM

## 2025-08-25 PROCEDURE — 3046F HEMOGLOBIN A1C LEVEL >9.0%: CPT | Performed by: STUDENT IN AN ORGANIZED HEALTH CARE EDUCATION/TRAINING PROGRAM

## 2025-08-25 PROCEDURE — 3075F SYST BP GE 130 - 139MM HG: CPT | Performed by: STUDENT IN AN ORGANIZED HEALTH CARE EDUCATION/TRAINING PROGRAM

## 2025-08-25 PROCEDURE — 83036 HEMOGLOBIN GLYCOSYLATED A1C: CPT | Performed by: STUDENT IN AN ORGANIZED HEALTH CARE EDUCATION/TRAINING PROGRAM

## 2025-08-25 PROCEDURE — 99215 OFFICE O/P EST HI 40 MIN: CPT | Performed by: STUDENT IN AN ORGANIZED HEALTH CARE EDUCATION/TRAINING PROGRAM

## 2025-08-25 RX ORDER — BLOOD-GLUCOSE SENSOR
EACH MISCELLANEOUS
Qty: 2 EACH | Refills: 11 | Status: SHIPPED | OUTPATIENT
Start: 2025-08-25

## 2025-08-25 RX ORDER — INSULIN GLARGINE 100 [IU]/ML
10 INJECTION, SOLUTION SUBCUTANEOUS NIGHTLY
Qty: 9 ML | Refills: 3 | Status: SHIPPED | OUTPATIENT
Start: 2025-08-25 | End: 2026-08-25

## 2025-08-25 RX ORDER — GLIPIZIDE 5 MG/1
5 TABLET ORAL
Qty: 60 TABLET | Refills: 11 | Status: SHIPPED | OUTPATIENT
Start: 2025-08-25 | End: 2026-08-25

## 2025-08-25 ASSESSMENT — PATIENT HEALTH QUESTIONNAIRE - PHQ9
2. FEELING DOWN, DEPRESSED OR HOPELESS: SEVERAL DAYS
1. LITTLE INTEREST OR PLEASURE IN DOING THINGS: SEVERAL DAYS
SUM OF ALL RESPONSES TO PHQ9 QUESTIONS 1 & 2: 2

## 2025-08-28 ENCOUNTER — TELEMEDICINE CLINICAL SUPPORT (OUTPATIENT)
Dept: PRIMARY CARE | Facility: CLINIC | Age: 45
End: 2025-08-28
Payer: COMMERCIAL

## 2025-08-28 DIAGNOSIS — E11.9 DIABETES MELLITUS TYPE 2 WITHOUT RETINOPATHY (MULTI): Primary | ICD-10-CM

## 2025-08-28 PROCEDURE — 97803 MED NUTRITION INDIV SUBSEQ: CPT | Performed by: DIETITIAN, REGISTERED

## 2025-08-29 DIAGNOSIS — E11.9 DIABETES MELLITUS TYPE 2 WITHOUT RETINOPATHY (MULTI): Primary | ICD-10-CM

## 2025-08-29 RX ORDER — PEN NEEDLE, DIABETIC 30 GX3/16"
NEEDLE, DISPOSABLE MISCELLANEOUS
Qty: 100 EACH | Refills: 0 | Status: SHIPPED | OUTPATIENT
Start: 2025-08-29

## 2025-08-29 RX ORDER — PEN NEEDLE, DIABETIC 30 GX3/16"
NEEDLE, DISPOSABLE MISCELLANEOUS DAILY
COMMUNITY
End: 2025-08-29 | Stop reason: SDUPTHER

## 2025-09-03 ENCOUNTER — TELEPHONE (OUTPATIENT)
Dept: ENDOCRINOLOGY | Facility: CLINIC | Age: 45
End: 2025-09-03
Payer: COMMERCIAL

## 2025-09-03 DIAGNOSIS — E61.1 IRON DEFICIENCY: ICD-10-CM

## 2025-09-03 DIAGNOSIS — E03.8 OTHER SPECIFIED HYPOTHYROIDISM: ICD-10-CM

## 2025-09-03 RX ORDER — FERROUS SULFATE 325(65) MG
1 TABLET ORAL
Qty: 180 TABLET | Refills: 0 | Status: SHIPPED | OUTPATIENT
Start: 2025-09-03

## 2025-09-03 RX ORDER — CHOLECALCIFEROL (VITAMIN D3) 50 MCG
TABLET ORAL
Qty: 30 TABLET | Refills: 3 | OUTPATIENT
Start: 2025-09-03

## 2025-09-03 RX ORDER — CHOLECALCIFEROL (VITAMIN D3) 50 MCG
TABLET ORAL
Qty: 90 TABLET | Refills: 0 | Status: SHIPPED | OUTPATIENT
Start: 2025-09-03

## 2025-09-05 ENCOUNTER — HOSPITAL ENCOUNTER (OUTPATIENT)
Dept: RADIOLOGY | Facility: HOSPITAL | Age: 45
Discharge: HOME | End: 2025-09-05
Payer: COMMERCIAL

## 2025-09-05 DIAGNOSIS — E06.3 HYPOTHYROIDISM DUE TO HASHIMOTO'S THYROIDITIS: ICD-10-CM

## 2025-09-05 DIAGNOSIS — C73 PAPILLARY THYROID CARCINOMA: ICD-10-CM

## 2025-09-05 PROCEDURE — 76536 US EXAM OF HEAD AND NECK: CPT

## 2025-09-06 LAB
25(OH)D3+25(OH)D2 SERPL-MCNC: 33 NG/ML (ref 30–100)
ALBUMIN SERPL-MCNC: 3.8 G/DL (ref 3.6–5.1)
BUN SERPL-MCNC: 6 MG/DL (ref 7–25)
BUN/CREAT SERPL: 10 (CALC) (ref 6–22)
CALCIUM SERPL-MCNC: 8.9 MG/DL (ref 8.6–10.2)
CHLORIDE SERPL-SCNC: 106 MMOL/L (ref 98–110)
CO2 SERPL-SCNC: 23 MMOL/L (ref 20–32)
CREAT SERPL-MCNC: 0.59 MG/DL (ref 0.5–0.99)
EGFRCR SERPLBLD CKD-EPI 2021: 112 ML/MIN/1.73M2
GLUCOSE SERPL-MCNC: 191 MG/DL (ref 65–99)
PHOSPHATE SERPL-MCNC: 4.8 MG/DL (ref 2.5–4.5)
POTASSIUM SERPL-SCNC: 4.7 MMOL/L (ref 3.5–5.3)
PTH-INTACT SERPL-MCNC: 34 PG/ML (ref 16–77)
SODIUM SERPL-SCNC: 137 MMOL/L (ref 135–146)
T4 FREE SERPL-MCNC: 1.7 NG/DL (ref 0.8–1.8)
THYROGLOB AB SERPL-ACNC: NORMAL [IU]/ML
TSH SERPL-ACNC: 0.15 MIU/L

## 2025-09-22 ENCOUNTER — APPOINTMENT (OUTPATIENT)
Dept: BEHAVIORAL HEALTH | Facility: CLINIC | Age: 45
End: 2025-09-22
Payer: COMMERCIAL

## 2025-10-07 ENCOUNTER — APPOINTMENT (OUTPATIENT)
Dept: PHARMACY | Facility: HOSPITAL | Age: 45
End: 2025-10-07
Payer: COMMERCIAL

## 2025-10-10 ENCOUNTER — APPOINTMENT (OUTPATIENT)
Dept: PRIMARY CARE | Facility: CLINIC | Age: 45
End: 2025-10-10
Payer: COMMERCIAL